# Patient Record
Sex: MALE | Race: BLACK OR AFRICAN AMERICAN | NOT HISPANIC OR LATINO | Employment: OTHER | URBAN - METROPOLITAN AREA
[De-identification: names, ages, dates, MRNs, and addresses within clinical notes are randomized per-mention and may not be internally consistent; named-entity substitution may affect disease eponyms.]

---

## 2023-06-02 ENCOUNTER — HOSPITAL ENCOUNTER (OUTPATIENT)
Dept: DATA CONVERSION | Facility: HOSPITAL | Age: 58
End: 2023-06-02
Attending: INTERNAL MEDICINE | Admitting: INTERNAL MEDICINE
Payer: MEDICARE

## 2023-06-02 DIAGNOSIS — N18.9 CHRONIC KIDNEY DISEASE, UNSPECIFIED: ICD-10-CM

## 2023-06-02 DIAGNOSIS — G47.30 SLEEP APNEA, UNSPECIFIED: ICD-10-CM

## 2023-06-02 DIAGNOSIS — I48.91 UNSPECIFIED ATRIAL FIBRILLATION (MULTI): ICD-10-CM

## 2023-06-02 DIAGNOSIS — I50.23 ACUTE ON CHRONIC SYSTOLIC (CONGESTIVE) HEART FAILURE (MULTI): ICD-10-CM

## 2023-06-02 DIAGNOSIS — I13.0 HYPERTENSIVE HEART AND CHRONIC KIDNEY DISEASE WITH HEART FAILURE AND STAGE 1 THROUGH STAGE 4 CHRONIC KIDNEY DISEASE, OR UNSPECIFIED CHRONIC KIDNEY DISEASE (MULTI): ICD-10-CM

## 2023-06-06 LAB
POC ACTIVATED CLOTTING TIME HIGH RANGE: 217 SECONDS (ref 96–152)
POC ACTIVATED CLOTTING TIME HIGH RANGE: 314 SECONDS (ref 96–152)
POC ACTIVATED CLOTTING TIME HIGH RANGE: 349 SECONDS (ref 96–152)
POC ACTIVATED CLOTTING TIME HIGH RANGE: 362 SECONDS (ref 96–152)
POC ACTIVATED CLOTTING TIME HIGH RANGE: 372 SECONDS (ref 96–152)

## 2023-08-25 ENCOUNTER — NURSE TRIAGE (OUTPATIENT)
Dept: OTHER | Facility: CLINIC | Age: 58
End: 2023-08-25

## 2023-08-25 NOTE — TELEPHONE ENCOUNTER
Location of patient: [place]    Subjective: Caller states \"When I awaken in the am my eyes are bloodshot. I don't have any energy and my knees hurt. It hurts when I walk. When I stand up I get dizzy. \"     Current Symptoms: no vision     Onset:  weakness and fatigue for about a week     Pain Severity:  severe pain in knees when walking    What has been tried: nothing so far    Recommended disposition: Go to ED Now    Care advice provided, patient verbalizes understanding; denies any other questions or concerns; instructed to call back for any new or worsening symptoms. Pt reports dizziness on ambulation and severe pain. Triage reveals need to be evaluated right away. This triage is a result of a call to [address] [place] [address]. Please do not respond to the triage nurse through this encounter. Any subsequent communication should be directly with the patient.     Reason for Disposition   Patient sounds very sick or weak to the triager    Protocols used: Weakness (Generalized) and Fatigue-ADULT-

## 2023-09-15 ENCOUNTER — HOSPITAL ENCOUNTER (OUTPATIENT)
Facility: HOSPITAL | Age: 58
Setting detail: OUTPATIENT SURGERY
End: 2023-09-15
Attending: SURGERY | Admitting: SURGERY
Payer: COMMERCIAL

## 2023-09-30 NOTE — H&P
History of Present Illness:   History Present Illness:  Reason for surgery: afib ablation   HPI:    He is a 58 year-old with    1. CKD and HTN  2. ANTONIO - on CPAP  3. Polysubstance abuse - cocaine, ETOH, tobacco but he has stopped taking any drugs at the end of 2020.   4. HFrEF   5. Cardiac arrest - put under anesthesia for hernia surgery and arrested. Surgery not done  6. Persistent AF - on EKG (AUG 2020) and (FEB 2022)     DCCV at [facility] (JUNE 2020). The cardioversion was around the time he had hiatal hernia surgery and he needed to be cardioverted because of hemodynamic instability. He subsequently reverted back into atrial fibrillation. He had stopped working because of  limitations he had secondary to the atrial fibrillation.     Feb 2022: We discussed the atrial fibrillation I feel because of his age, depressed EF, symptoms clearly rhythm control is preferable. We discussed Amiodarone (his GFR is in the 40's ) vs PVI. I feel in patients with heart failure the proarrhythmic effects  of antiarrhythmics outweigh the risk of the antiarrhythmics. I did explain the PVI to him with all the risks and benefits and we are proceeding with it on April 14, 2022.    TESTING:      -CARDIAC MRI (APRL 2020) at [facility] showed findings suggestive to NICM dialated, LV mildly dilated with mod global systolic dysfunction (LVEF 37%). Mild mid myocardial enhancement involving mid-distal inferior and inferolateral wals as well as basal = mid  RV insertion points. Unable to distinguish between active inflammation and scar. Normal RV size and systolic function. Biatrial enlargement. No significant valve dysfunction.      -NM PET/CT CARDIAC VIABILITY (MARCH 2020) conclusion abnormal study. No evidence of ischemia. Evidence of small (<10%) scar in the territory of the distal LAD. LV severely dilated and systolic function is moderately decreased. Normal RV size and systolic  function.    -ECHO: TTE (Feb 2023) LVEF 40%. LV global hypokinesis  with minor regional variations. LA mod dilated. Pt in AF during exam. LVIDd 6 cm.         Allergies:        Allergies:  ·  No Known Allergies :     Home Medication Review:   Home Medications Reviewed: yes     Impression/Procedure:   ·  Impression and Planned Procedure: afib abd CTI ablation       ERAS (Enhanced Recovery After Surgery):  ·  ERAS Patient: no       Physical Exam by System:    Respiratory/Thorax: Patent airways, CTAB, normal  breath sounds with good chest expansion, thorax symmetric   Cardiovascular: Regular, rate and rhythm, no murmurs,  2+ equal pulses of the extremities, normal S 1and S 2     Airway/Sedation Assessment:  ·  Assmentment by Anesthesia See anesthesia airway/sedation assessment.     Consent:   COVID-19 Consent:  ·  COVID-19 Risk Consent Surgeon has reviewed key risks related to the risk of britany COVID-19 and if they contract COVID-19 what the risks are.     Coronavirus Attestation of Need for Surgery:  ·  COVID-19 Surgery / Procedure Attestation: Select all criteria that apply Risk of metastasis or progression of staging if delayed     Attestation:   Note Completion:  I am a:  Resident/Fellow   Attending Attestation I saw and evaluated the patient.  I personally obtained the key and critical portions of the history and physical exam or was physically present for key and  critical portions performed by the resident/fellow. I reviewed the resident/fellow?s documentation and discussed the patient with the resident/fellow.  I agree with the resident/fellow?s medical decision making as documented in the note.     I personally evaluated the patient on 02-Jun-2023         Electronic Signatures:  Geovanny Zuniga)  (Signed 02-Jun-2023 14:16)   Authored: Note Completion   Co-Signer: History of Present Illness, Allergies, Home Medication Review, Impression/Procedure, ERAS, Physical Exam, Consent  Roberto Carlos Zeng (Resident))  (Signed 02-Jun-2023 07:21)   Authored: History of Present  Illness, Allergies, Home  Medication Review, Impression/Procedure, ERAS, Physical Exam, Consent      Last Updated: 02-Jun-2023 14:16 by Geovanny Zuniga)

## 2023-10-02 ENCOUNTER — TELEPHONE (OUTPATIENT)
Dept: SURGERY | Facility: CLINIC | Age: 58
End: 2023-10-02

## 2023-10-02 ENCOUNTER — OFFICE VISIT (OUTPATIENT)
Dept: SURGERY | Facility: CLINIC | Age: 58
End: 2023-10-02
Payer: MEDICARE

## 2023-10-02 NOTE — PROGRESS NOTES
Per dr jaqueline schmidt appt to make new on with Migel lynn called patient gave him all the info on new

## 2023-10-03 PROBLEM — K62.89 RECTAL PAIN: Status: ACTIVE | Noted: 2023-10-03

## 2023-10-03 PROBLEM — I50.9 HEART FAILURE (MULTI): Status: ACTIVE | Noted: 2023-10-03

## 2023-10-03 PROBLEM — K60.2 RECTAL FISSURE: Status: ACTIVE | Noted: 2023-10-03

## 2023-10-03 PROBLEM — K62.5 HEMORRHAGE OF RECTUM AND ANUS: Status: ACTIVE | Noted: 2022-03-07

## 2023-10-03 PROBLEM — R19.5 POSITIVE COLORECTAL CANCER SCREENING USING COLOGUARD TEST: Status: ACTIVE | Noted: 2023-10-03

## 2023-10-03 PROBLEM — M21.6X2 PRONATION OF BOTH FEET: Status: ACTIVE | Noted: 2023-10-03

## 2023-10-03 PROBLEM — R63.5 WEIGHT GAIN: Status: ACTIVE | Noted: 2023-10-03

## 2023-10-03 PROBLEM — I25.9: Status: ACTIVE | Noted: 2023-10-03

## 2023-10-03 PROBLEM — I12.9 ARTERIOLAR NEPHRITIS: Status: ACTIVE | Noted: 2021-06-09

## 2023-10-03 PROBLEM — L73.2 HIDRADENITIS SUPPURATIVA: Status: ACTIVE | Noted: 2023-05-23

## 2023-10-03 PROBLEM — K62.5 RECTAL BLEEDING: Status: ACTIVE | Noted: 2023-10-03

## 2023-10-03 PROBLEM — R94.128 ABNORMAL TYMPANOGRAM: Status: ACTIVE | Noted: 2023-10-03

## 2023-10-03 PROBLEM — H52.03 HYPEROPIA OF BOTH EYES: Status: ACTIVE | Noted: 2023-10-03

## 2023-10-03 PROBLEM — R00.2 PALPITATIONS: Status: ACTIVE | Noted: 2023-10-03

## 2023-10-03 PROBLEM — K42.9 REDUCIBLE UMBILICAL HERNIA: Status: ACTIVE | Noted: 2023-10-03

## 2023-10-03 PROBLEM — N17.9 ACUTE RENAL FAILURE SUPERIMPOSED ON STAGE 3 CHRONIC KIDNEY DISEASE (MULTI): Status: ACTIVE | Noted: 2023-10-03

## 2023-10-03 PROBLEM — E78.5 ACQUIRED HYPERLIPOPROTEINEMIA: Status: ACTIVE | Noted: 2022-07-07

## 2023-10-03 PROBLEM — I13.0 HEART FAILURE AND KIDNEY DISEASE DUE TO HIGH BLOOD PRESSURE (MULTI): Status: ACTIVE | Noted: 2020-08-11

## 2023-10-03 PROBLEM — I21.4 NSTEMI, INITIAL EPISODE OF CARE (MULTI): Status: ACTIVE | Noted: 2023-10-03

## 2023-10-03 PROBLEM — R19.8 ABNORMAL DEFECATION: Status: ACTIVE | Noted: 2023-10-03

## 2023-10-03 PROBLEM — I16.1 HYPERTENSIVE EMERGENCY: Status: ACTIVE | Noted: 2023-10-03

## 2023-10-03 PROBLEM — F19.10 POLYSUBSTANCE ABUSE (MULTI): Status: ACTIVE | Noted: 2023-10-03

## 2023-10-03 PROBLEM — L60.8 NAIL DEFORMITY: Status: ACTIVE | Noted: 2023-10-03

## 2023-10-03 PROBLEM — L02.91 ABSCESS: Status: ACTIVE | Noted: 2023-10-03

## 2023-10-03 PROBLEM — I11.0 BENIGN HYPERTENSIVE HEART DISEASE WITH HEART FAILURE (MULTI): Status: ACTIVE | Noted: 2022-03-07

## 2023-10-03 PROBLEM — I50.20 SYSTOLIC CONGESTIVE HEART FAILURE (MULTI): Status: ACTIVE | Noted: 2022-03-07

## 2023-10-03 PROBLEM — I10 ESSENTIAL HYPERTENSION: Status: ACTIVE | Noted: 2023-10-03

## 2023-10-03 PROBLEM — I50.23: Status: ACTIVE | Noted: 2023-10-03

## 2023-10-03 PROBLEM — L02.419 CELLULITIS AND ABSCESS OF LOWER EXTREMITY: Status: ACTIVE | Noted: 2021-06-09

## 2023-10-03 PROBLEM — E66.01 MORBID OBESITY (MULTI): Status: ACTIVE | Noted: 2021-06-09

## 2023-10-03 PROBLEM — H52.203 ASTIGMATISM OF BOTH EYES: Status: ACTIVE | Noted: 2023-10-03

## 2023-10-03 PROBLEM — M21.6X1 PRONATION OF BOTH FEET: Status: ACTIVE | Noted: 2023-10-03

## 2023-10-03 PROBLEM — L03.119 CELLULITIS AND ABSCESS OF LOWER EXTREMITY: Status: ACTIVE | Noted: 2021-06-09

## 2023-10-03 PROBLEM — N52.9 ERECTILE DISORDER: Status: ACTIVE | Noted: 2023-10-03

## 2023-10-03 PROBLEM — T83.9XXS: Status: ACTIVE | Noted: 2023-10-03

## 2023-10-03 PROBLEM — N18.30 CKD (CHRONIC KIDNEY DISEASE) STAGE 3, GFR 30-59 ML/MIN (MULTI): Status: ACTIVE | Noted: 2020-08-11

## 2023-10-03 PROBLEM — K63.5 HYPERPLASTIC COLON POLYP: Status: ACTIVE | Noted: 2023-10-03

## 2023-10-03 PROBLEM — N18.30 ACUTE RENAL FAILURE SUPERIMPOSED ON STAGE 3 CHRONIC KIDNEY DISEASE (MULTI): Status: ACTIVE | Noted: 2023-10-03

## 2023-10-03 PROBLEM — Z98.84 BARIATRIC SURGERY STATUS: Status: ACTIVE | Noted: 2023-10-03

## 2023-10-03 PROBLEM — R06.00 DYSPNEA: Status: ACTIVE | Noted: 2023-10-03

## 2023-10-03 PROBLEM — G47.33 OSA ON CPAP: Status: ACTIVE | Noted: 2023-10-03

## 2023-10-03 PROBLEM — I48.91 ATRIAL FIBRILLATION (MULTI): Status: ACTIVE | Noted: 2021-06-09

## 2023-10-03 PROBLEM — N18.9 CKD (CHRONIC KIDNEY DISEASE): Status: ACTIVE | Noted: 2023-10-03

## 2023-10-03 PROBLEM — G47.30 SLEEP APNEA IN ADULT: Status: ACTIVE | Noted: 2023-10-03

## 2023-10-03 RX ORDER — NYSTATIN 100000 [USP'U]/G
POWDER TOPICAL
COMMUNITY
Start: 2023-09-23

## 2023-10-03 RX ORDER — TRAZODONE HYDROCHLORIDE 100 MG/1
TABLET ORAL
COMMUNITY
Start: 2023-09-23

## 2023-10-03 RX ORDER — OXYCODONE AND ACETAMINOPHEN 5; 325 MG/1; MG/1
1 TABLET ORAL EVERY 4 HOURS PRN
COMMUNITY
Start: 2023-08-31

## 2023-10-03 RX ORDER — TORSEMIDE 20 MG/1
20 TABLET ORAL 2 TIMES DAILY
COMMUNITY

## 2023-10-03 RX ORDER — FLUOXETINE HCL 20 MG
20 CAPSULE ORAL DAILY
COMMUNITY
Start: 2022-03-03

## 2023-10-03 RX ORDER — CARVEDILOL 12.5 MG/1
3 TABLET ORAL 2 TIMES DAILY
COMMUNITY
Start: 2023-08-23

## 2023-10-03 RX ORDER — APIXABAN 5 MG/1
5 TABLET, FILM COATED ORAL 2 TIMES DAILY
COMMUNITY

## 2023-10-03 RX ORDER — SENNOSIDES 25 MG/1
1 TABLET, FILM COATED ORAL
COMMUNITY
Start: 2022-02-21

## 2023-10-03 RX ORDER — TEMAZEPAM 15 MG/1
CAPSULE ORAL
COMMUNITY
Start: 2022-12-14

## 2023-10-03 RX ORDER — FLUTICASONE PROPIONATE 50 MCG
2 SPRAY, SUSPENSION (ML) NASAL DAILY
COMMUNITY
Start: 2023-09-27

## 2023-10-03 RX ORDER — FLUOCINOLONE ACETONIDE 0.11 MG/ML
OIL AURICULAR (OTIC)
COMMUNITY
Start: 2023-09-27

## 2023-10-03 RX ORDER — SILDENAFIL 50 MG/1
50 TABLET, FILM COATED ORAL AS NEEDED
COMMUNITY
Start: 2020-08-26

## 2023-10-03 RX ORDER — LISINOPRIL 20 MG/1
20 TABLET ORAL 2 TIMES DAILY
COMMUNITY

## 2023-10-03 RX ORDER — ZOLPIDEM TARTRATE 5 MG/1
5 TABLET ORAL NIGHTLY PRN
COMMUNITY
Start: 2023-05-26

## 2023-10-03 RX ORDER — ATORVASTATIN CALCIUM 10 MG/1
TABLET, FILM COATED ORAL
COMMUNITY
Start: 2023-07-22

## 2023-10-03 RX ORDER — LIDOCAINE 50 MG/G
PATCH TOPICAL
COMMUNITY
Start: 2023-07-22

## 2023-10-03 RX ORDER — SPIRONOLACTONE 25 MG/1
12.5 TABLET ORAL DAILY
COMMUNITY

## 2023-10-03 RX ORDER — IBUPROFEN 600 MG/1
TABLET ORAL
COMMUNITY
Start: 2023-09-23

## 2023-10-05 ENCOUNTER — APPOINTMENT (OUTPATIENT)
Dept: SURGERY | Facility: CLINIC | Age: 58
End: 2023-10-05
Payer: MEDICARE

## 2023-10-12 ENCOUNTER — APPOINTMENT (OUTPATIENT)
Dept: SURGERY | Facility: CLINIC | Age: 58
End: 2023-10-12
Payer: MEDICARE

## 2023-10-24 ENCOUNTER — APPOINTMENT (OUTPATIENT)
Dept: SURGERY | Facility: CLINIC | Age: 58
End: 2023-10-24
Payer: MEDICARE

## 2023-10-27 ENCOUNTER — OFFICE VISIT (OUTPATIENT)
Dept: SURGERY | Facility: CLINIC | Age: 58
End: 2023-10-27
Payer: MEDICARE

## 2023-10-27 VITALS
BODY MASS INDEX: 39.17 KG/M2 | HEIGHT: 75 IN | TEMPERATURE: 96.9 F | WEIGHT: 315 LBS | SYSTOLIC BLOOD PRESSURE: 118 MMHG | HEART RATE: 65 BPM | DIASTOLIC BLOOD PRESSURE: 64 MMHG

## 2023-10-27 DIAGNOSIS — K42.9 UMBILICAL HERNIA WITHOUT OBSTRUCTION OR GANGRENE: Primary | ICD-10-CM

## 2023-10-27 PROCEDURE — 99214 OFFICE O/P EST MOD 30 MIN: CPT | Performed by: STUDENT IN AN ORGANIZED HEALTH CARE EDUCATION/TRAINING PROGRAM

## 2023-10-27 PROCEDURE — 3078F DIAST BP <80 MM HG: CPT | Performed by: STUDENT IN AN ORGANIZED HEALTH CARE EDUCATION/TRAINING PROGRAM

## 2023-10-27 PROCEDURE — 3074F SYST BP LT 130 MM HG: CPT | Performed by: STUDENT IN AN ORGANIZED HEALTH CARE EDUCATION/TRAINING PROGRAM

## 2023-10-27 ASSESSMENT — PAIN SCALES - GENERAL: PAINLEVEL: 10-WORST PAIN EVER

## 2023-10-27 NOTE — PROGRESS NOTES
Reason for visit  Consult for ventral umbilical hernia repair     History Of Present Illness  Pt is a 58-year-old male presenting for a consultation for a ventral umbilical hernia repair. Pt endorses having the hernia for the past 20 years. Denies hx of previous hernias. Denies associated pain, tenderness, skin changes, fever, or abdominal pain. Pt endorses that hernia is reducible.  Patient has a fairly significant past cardiac history including atrial fibrillation status post ablation and is currently in sinus rhythm.  He also had an unfortunate cardiac arrest at the time of anesthesia induction secondary to arrhythmias.  He follows closely with a cardiologist here at  and is currently on anticoagulation.     Past Medical History  CKD, HTN, HFrEF, polysubstance use disorder, persistent atrial fibrillation     Medications  apixaban, atorvastatin, carvedilol, nystatin, fluticasone, trazodone, zolpidem    Surgical History  06/2020 -attempted hiatal hernia repair complicated by cardiac arrest on anesthesia induction.    Social History  Current nicotine use. > 10 pack year smoking history.      Allergies  No Known Allergies.     Review of Systems  - CONSTITUTIONAL: Denies fever, chills, recent weight loss.  - RESPIRATORY: Denies SOB and cough.  - CV: Denies CP.  - GI: Denies abdominal pain, nausea, change in bowel habits.    - : Denies oliguria, dysuria, polyuria.  - Neuro: Denies lower limb weakness, parasthesia. Endorses bilateral     dorsal/plantar metatarsal numbness.     Physical Exam  - GENERAL: No acute distress. Well nourished.   - LUNGS: Breathing comfortably on room air. No accessory muscle      use, no distress.  - CARDIOVASCULAR: Regular rate and rhythm. S1 and S2 detected.  -ABDOMEN: Soft non-distended.  Reducible 4-5 cm diameter       umbilical mass noted. Mass is nontender, w/o erythema, edema,     overlying skin changes.  - EXTREMITIES: No edema. Non-tender. +2 DP pulses bilaterally.  - SKIN: No  rashes or lesions. Warm. 5 mm cyst near left cephalad      aspect of the gluteal cleft. Produces purulent fluid upon      palpation.       Assessment and Plan  Patient is a 58-year-old male presenting for a ventral umbilical hernia repair. We discussed the etiology, pathophysiology, various treatment options, and potential risks associated with both watchful waiting vs surgical management. Patient endorses desire for surgical repair of hernia.  He [place] [facility] [place] [facility] due to his fairly significant cardiac history.  Will plan for elective robot assisted ventral hernia repair in the near future.  [facility] and therefore saw his cardiologist for a preoperative evaluation which is completed and reviewed in his chart.  He is cleared to stop his anticoagulation 2 days prior to the procedure and will restart pending the operative findings and outcome.  The risks including bleeding, infection, nonresolution of symptoms, hernia recurrence, anesthesia complications approximate the patient voiced understanding.  Informed consent was signed for the above procedure in the office he will be scheduled in the near future for the above procedure.

## 2023-11-15 ENCOUNTER — DOCUMENTATION (OUTPATIENT)
Dept: SURGERY | Facility: HOSPITAL | Age: 58
End: 2023-11-15
Payer: MEDICARE

## 2023-11-15 NOTE — PROGRESS NOTES
Received a CRM for patient to get scheduled and restarted with the program. The patient has been away from the program for over 1 year, he was sent to the website so that new verification can be completed. He was advised after that has been completed, he will be contacted and scheduled if applicable. He understood.

## 2023-11-20 ENCOUNTER — HOSPITAL ENCOUNTER (OUTPATIENT)
Facility: HOSPITAL | Age: 58
Setting detail: OUTPATIENT SURGERY
End: 2023-11-20
Attending: STUDENT IN AN ORGANIZED HEALTH CARE EDUCATION/TRAINING PROGRAM | Admitting: STUDENT IN AN ORGANIZED HEALTH CARE EDUCATION/TRAINING PROGRAM
Payer: MEDICARE

## 2023-11-20 PROBLEM — K42.9 UMBILICAL HERNIA WITHOUT OBSTRUCTION OR GANGRENE: Status: ACTIVE | Noted: 2023-10-27

## 2024-02-12 DIAGNOSIS — K42.9 UMBILICAL HERNIA WITHOUT OBSTRUCTION AND WITHOUT GANGRENE: ICD-10-CM

## 2024-02-14 ENCOUNTER — HOSPITAL ENCOUNTER (OUTPATIENT)
Facility: HOSPITAL | Age: 59
Setting detail: OUTPATIENT SURGERY
End: 2024-02-14
Attending: STUDENT IN AN ORGANIZED HEALTH CARE EDUCATION/TRAINING PROGRAM | Admitting: STUDENT IN AN ORGANIZED HEALTH CARE EDUCATION/TRAINING PROGRAM
Payer: MEDICARE

## 2024-02-14 PROBLEM — K42.9 UMBILICAL HERNIA WITHOUT OBSTRUCTION AND WITHOUT GANGRENE: Status: ACTIVE | Noted: 2024-02-12

## 2024-04-01 ENCOUNTER — APPOINTMENT (OUTPATIENT)
Dept: PREADMISSION TESTING | Facility: HOSPITAL | Age: 59
End: 2024-04-01
Payer: MEDICARE

## 2024-04-05 NOTE — PROGRESS NOTES
Chief Complaint   Patient presents with    Error (VOID this visit)        Current Outpatient Medications   Medication Instructions    atorvastatin (Lipitor) 10 mg tablet     benzoyl peroxide 5 % lotion 1 Application    carvedilol (Coreg) 12.5 mg tablet 3 tablets, oral, 2 times daily    Eliquis 5 mg, oral, 2 times daily    fluocinolone (DermOtic) 0.01 % ear drops     fluticasone (Flonase) 50 mcg/actuation nasal spray 2 sprays, Each Nostril, Daily     mg tablet     lidocaine (Lidoderm) 5 % patch Apply as directed    lidocaine (RectiCare) 5 % cream cream 1 Application, Topical, 1 to 3 times a day as needed for pain    lisinopril 20 mg, oral, 2 times daily    nystatin (Mycostatin) 100,000 unit/gram powder     oxyCODONE-acetaminophen (Percocet) 5-325 mg tablet 1 tablet, oral, Every 4 hours PRN    PROzac 20 mg, oral, Daily    sildenafil (VIAGRA) 50 mg, oral, As needed    spironolactone (ALDACTONE) 12.5 mg, oral, Daily    temazepam (Restoril) 15 mg capsule     torsemide (DEMADEX) 20 mg, oral, 2 times daily    traZODone (Desyrel) 100 mg tablet     zolpidem (AMBIEN) 5 mg, oral, Nightly PRN        Joel Rutherford is a 59 y.o. with:     CKD and HTN  ANTONIO - on CPAP  Polysubstance abuse â€“ cocaine, ETOH, tobacco but he has stopped taking any drugs at the end of 2020.   HFrEF   Cardiac arrest - put under anesthesia for hernia surgery and arrested. Surgery not done  Persistent AF â€“ on EKG (AUG 2020) and (FEB 2022)     DCCV at [facility] (JUNE 2020). The cardioversion was around the time he had hiatal hernia surgery and he needed to be cardioverted because of hemodynamic instability. He subsequently reverted back into atrial fibrillation. He had stopped working because of limitations he had secondary to the atrial fibrillation.      Feb 2022: We discussed the atrial fibrillation I feel because of his age, depressed EF, symptoms clearly rhythm control is preferable. Scheduled PVI in April of 2022, was rescheduled due to pt getting  incarcerated.   June 2023: (June 2) Successful AF ablation and CTI line ablation . During ablation on the ridge and around the floor of the right inferior pulmonary vein frequent episodes of asystole were seen requiring ventricular pacing. First pass was not achieved on eitherside and additional lesions needed to be applied. (June 20) He is in NSR and feels better.         TESTING:        -ECHO: TTE (Feb 2023) LVEF 40%. LV global hypokinesis with minor regional variations. LA mod dilated. Pt in AF during exam. LVIDd 6 cm.     -CARDIAC MRI (APRL 2020) at [facility] showed findings suggestive to NICM dialated, LV mildly dilated with mod global systolic dysfunction (LVEF 37%). Mild mid myocardial enhancement involving mid-distal inferior and inferolateral wals as well as basal = mid RV insertion points. Unable to distinguish between active inflammation and scar. Normal RV size and systolic function. Biatrial enlargement. No significant valve dysfunction.      -NM PET/CT CARDIAC VIABILITY (MARCH 2020) conclusion abnormal study. No evidence of ischemia. Evidence of small (<10%) scar in the territory of the distal LAD. LV severely dilated and systolic function is moderately decreased. Normal RV size and systolic function.         Objective   Physical Exam  {exam; complete:94952}        Assessment/Plan   No problem-specific Assessment & Plan notes found for this encounter.

## 2024-04-08 ENCOUNTER — OFFICE VISIT (OUTPATIENT)
Dept: CARDIOLOGY | Facility: HOSPITAL | Age: 59
End: 2024-04-08
Payer: MEDICARE

## 2024-04-08 ENCOUNTER — APPOINTMENT (OUTPATIENT)
Dept: PREADMISSION TESTING | Facility: HOSPITAL | Age: 59
End: 2024-04-08
Payer: MEDICARE

## 2024-04-29 ENCOUNTER — DOCUMENTATION (OUTPATIENT)
Dept: CARDIOLOGY | Facility: HOSPITAL | Age: 59
End: 2024-04-29
Payer: MEDICARE

## 2024-04-29 NOTE — PROGRESS NOTES
EP PRE-CHART NOTE:      HF  AF    He is here for a 7 month follow-up.     CKD and HTN  ANTONIO - on CPAP  Polysubstance abuse â€“ cocaine, ETOH, tobacco but he has stopped taking any drugs at the end of 2020.   HFrEF   Cardiac arrest - put under anesthesia for hernia surgery and arrested. Surgery not done  Persistent AF â€“ on EKG (AUG 2020) and (FEB 2022)     DCCV at [facility] (JUNE 2020). The cardioversion was around the time he had hiatal hernia surgery and he needed to be cardioverted because of hemodynamic instability. He subsequently reverted back into atrial fibrillation. He had stopped working because of limitations he had secondary to the atrial fibrillation.      Feb 2022: We discussed the atrial fibrillation I feel because of his age, depressed EF, symptoms clearly rhythm control is preferable. Scheduled PVI in April of 2022, was rescheduled due to pt getting incarcerated.   June 2023: (June 2) Successful AF ablation and CTI line ablation . During ablation on the ridge and around the floor of the right inferior pulmonary vein frequent episodes of asystole were seen requiring ventricular pacing. First pass was not achieved on eitherside and additional lesions needed to be applied. (June 20) He is in NSR and feels better.         TESTING:                   -ECHO: TTE (Feb 2023) LVEF 40%. LV global hypokinesis with minor regional variations. LA mod dilated. Pt in AF during exam. LVIDd 6 cm.      -CARDIAC MRI (APRL 2020) at [facility] showed findings suggestive to NICM dialated, LV mildly dilated with mod global systolic dysfunction (LVEF 37%). Mild mid myocardial enhancement involving mid-distal inferior and inferolateral wals as well as basal = mid RV insertion points. Unable to distinguish between active inflammation and scar. Normal RV size and systolic function. Biatrial enlargement. No significant valve dysfunction.      -NM PET/CT CARDIAC VIABILITY (MARCH 2020) conclusion abnormal study. No evidence of ischemia.  Evidence of small (<10%) scar in the territory of the distal LAD. LV severely dilated and systolic function is moderately decreased. Normal RV size and systolic function.

## 2024-07-02 PROBLEM — K57.30 DIVERTICULOSIS OF LARGE INTESTINE WITHOUT HEMORRHAGE: Status: ACTIVE | Noted: 2021-12-13

## 2024-07-02 PROBLEM — F17.200 NICOTINE USE DISORDER: Status: ACTIVE | Noted: 2019-12-26

## 2024-07-02 PROBLEM — Z86.74 HISTORY OF CARDIAC ARREST: Status: ACTIVE | Noted: 2022-09-02

## 2024-07-17 ENCOUNTER — APPOINTMENT (OUTPATIENT)
Dept: CARDIOLOGY | Facility: CLINIC | Age: 59
End: 2024-07-17
Payer: MEDICARE

## 2024-08-14 ENCOUNTER — OFFICE VISIT (OUTPATIENT)
Dept: CARDIOLOGY | Facility: CLINIC | Age: 59
End: 2024-08-14
Payer: MEDICARE

## 2024-08-14 VITALS
DIASTOLIC BLOOD PRESSURE: 81 MMHG | HEIGHT: 75 IN | HEART RATE: 112 BPM | WEIGHT: 301 LBS | BODY MASS INDEX: 37.42 KG/M2 | OXYGEN SATURATION: 95 % | SYSTOLIC BLOOD PRESSURE: 119 MMHG

## 2024-08-14 DIAGNOSIS — I48.91 ATRIAL FIBRILLATION, UNSPECIFIED TYPE (MULTI): ICD-10-CM

## 2024-08-14 DIAGNOSIS — I10 ESSENTIAL HYPERTENSION: ICD-10-CM

## 2024-08-14 DIAGNOSIS — I50.22 CHRONIC SYSTOLIC HEART FAILURE (MULTI): Primary | ICD-10-CM

## 2024-08-14 PROCEDURE — 93005 ELECTROCARDIOGRAM TRACING: CPT | Performed by: INTERNAL MEDICINE

## 2024-08-14 PROCEDURE — 93010 ELECTROCARDIOGRAM REPORT: CPT | Performed by: INTERNAL MEDICINE

## 2024-08-14 PROCEDURE — 3074F SYST BP LT 130 MM HG: CPT | Performed by: INTERNAL MEDICINE

## 2024-08-14 PROCEDURE — 99214 OFFICE O/P EST MOD 30 MIN: CPT | Performed by: INTERNAL MEDICINE

## 2024-08-14 PROCEDURE — 3008F BODY MASS INDEX DOCD: CPT | Performed by: INTERNAL MEDICINE

## 2024-08-14 PROCEDURE — 3079F DIAST BP 80-89 MM HG: CPT | Performed by: INTERNAL MEDICINE

## 2024-08-14 ASSESSMENT — PAIN SCALES - GENERAL: PAINLEVEL: 6

## 2024-08-14 ASSESSMENT — PATIENT HEALTH QUESTIONNAIRE - PHQ9
3. TROUBLE FALLING OR STAYING ASLEEP OR SLEEPING TOO MUCH: NEARLY EVERY DAY
8. MOVING OR SPEAKING SO SLOWLY THAT OTHER PEOPLE COULD HAVE NOTICED. OR THE OPPOSITE, BEING SO FIGETY OR RESTLESS THAT YOU HAVE BEEN MOVING AROUND A LOT MORE THAN USUAL: NOT AT ALL
2. FEELING DOWN, DEPRESSED OR HOPELESS: MORE THAN HALF THE DAYS
1. LITTLE INTEREST OR PLEASURE IN DOING THINGS: NEARLY EVERY DAY
5. POOR APPETITE OR OVEREATING: MORE THAN HALF THE DAYS
7. TROUBLE CONCENTRATING ON THINGS, SUCH AS READING THE NEWSPAPER OR WATCHING TELEVISION: NOT AT ALL
SUM OF ALL RESPONSES TO PHQ QUESTIONS 1-9: 12
9. THOUGHTS THAT YOU WOULD BE BETTER OFF DEAD, OR OF HURTING YOURSELF: NOT AT ALL
SUM OF ALL RESPONSES TO PHQ9 QUESTIONS 1 AND 2: 5
4. FEELING TIRED OR HAVING LITTLE ENERGY: MORE THAN HALF THE DAYS
6. FEELING BAD ABOUT YOURSELF - OR THAT YOU ARE A FAILURE OR HAVE LET YOURSELF OR YOUR FAMILY DOWN: NOT AT ALL

## 2024-08-14 ASSESSMENT — COLUMBIA-SUICIDE SEVERITY RATING SCALE - C-SSRS
6. HAVE YOU EVER DONE ANYTHING, STARTED TO DO ANYTHING, OR PREPARED TO DO ANYTHING TO END YOUR LIFE?: NO
2. HAVE YOU ACTUALLY HAD ANY THOUGHTS OF KILLING YOURSELF?: NO
1. IN THE PAST MONTH, HAVE YOU WISHED YOU WERE DEAD OR WISHED YOU COULD GO TO SLEEP AND NOT WAKE UP?: NO

## 2024-08-14 NOTE — PROGRESS NOTES
Subjective   Joel Rutherford is a 59 y.o. male who presents to the [facility] for follow up evaluation of chronic systolic heart failure and atrial fibrillation. Last saw in February 2023.     Since his last visit, stable cardiac status with no active symptoms of chest pain, PND, orthopnea, THI, palpitations, syncope, or claudication.     Stable exertional dyspnea with 1-2 flights of stair climbing. NYHA class I-IIa. Sedentary. Has not started exercise program.     Stopped Jardiance 10 mg a day shortly after taking for concern of side effects in 2022. Attributed a rectal tear that he sought ER evaluation for to this medication. Did not go to a  facility at that time per 3/2023 office note with me.     Past Medical History:  1. Chronic systolic heart failure: 9/2019 [facility]% [facility]. 2020 MRI 37% at [facility].  2. Atrial fibrillation, chronic, on DOAC; s/p 6/2/2023 AFib PVI and CTI ablation by Dr. Zuniga.  3. Hypertension  4. CKD stage 3  5. Obesity  6. History of cardiac arrest with induction of anesthesia for canceled hernia repair surgery  7. Polyarticular gout     Social History:  Active tobacco smoker (1/2 pack/day). Prior cocaine use (last in 2019)     Family History:  No premature CAD in 1st degree relatives ( 55 years of age for male relatives, 65 years of age for female relatives)     Review of Systems    A 14 point review of systems was asked. All questions were negative except for pertinent positives listed in the HPI.     Current Outpatient Medications on File Prior to Visit   Medication Sig Dispense Refill    benzoyl peroxide 5 % lotion 1 Application.      carvedilol (Coreg) 12.5 mg tablet Take 3 tablets (37.5 mg) by mouth 2 times a day.      Eliquis 5 mg tablet Take 1 tablet (5 mg) by mouth 2 times a day.      famotidine (Pepcid) 40 mg tablet Take 1 tablet (40 mg) by mouth 2 times a day.      fluocinolone (DermOtic) 0.01 % ear drops       fluticasone (Flonase) 50  "mcg/actuation nasal spray Administer 2 sprays into each nostril once daily.       mg tablet       nystatin (Mycostatin) 100,000 unit/gram powder       oxyCODONE-acetaminophen (Percocet) 5-325 mg tablet Take 1 tablet by mouth every 4 hours if needed.      Ozempic 0.25 mg or 0.5 mg (2 mg/3 mL) pen injector       PROzac 20 mg capsule Take 1 capsule (20 mg) by mouth once daily.      sildenafil (Viagra) 50 mg tablet Take 1 tablet (50 mg) by mouth if needed (1 hour before needed).      spironolactone (Aldactone) 25 mg tablet Take 0.5 tablets (12.5 mg) by mouth once daily.      torsemide (Demadex) 20 mg tablet Take 1 tablet (20 mg) by mouth 2 times a day.      traZODone (Desyrel) 100 mg tablet       atorvastatin (Lipitor) 10 mg tablet       atorvastatin (Lipitor) 20 mg tablet       lidocaine (Lidoderm) 5 % patch Apply as directed      lidocaine (RectiCare) 5 % cream cream Apply 1 Application topically. 1 to 3 times a day as needed for pain      lisinopril 20 mg tablet Take 1 tablet (20 mg) by mouth 2 times a day.      temazepam (Restoril) 15 mg capsule       zolpidem (Ambien) 5 mg tablet Take 1 tablet (5 mg) by mouth as needed at bedtime (insomnia).       No current facility-administered medications on file prior to visit.         Objective   Physical Exam  BP Readings from Last 3 Encounters:   08/14/24 119/81   10/27/23 118/64   09/19/23 124/72      Wt Readings from Last 3 Encounters:   08/14/24 137 kg (301 lb)   10/27/23 144 kg (318 lb)   09/27/23 144 kg (317 lb 4 oz)      BMI: Estimated body mass index is 37.62 kg/m² as calculated from the following:    Height as of this encounter: 1.905 m (6' 3\").    Weight as of this encounter: 137 kg (301 lb).  BSA: Estimated body surface area is 2.69 meters squared as calculated from the following:    Height as of this encounter: 1.905 m (6' 3\").    Weight as of this encounter: 137 kg (301 lb).    General: no acute distress  HEENT: EOMI, no scleral icterus.  Lungs: Clear to " auscultation bilaterally without wheezing, rales, or rhonchi.  Cardiovascular: Regular rhythm and rate. Normal S1 and S2. No murmurs, rubs, or gallops are appreciated. JVP normal.  Abdomen: Soft, nontender, nondistended. Bowel sounds present.  Extremities: Warm and well perfused with equal 2+ pulses bilaterally.  No edema present.  Neurologic: Alert and oriented x3.      I have personally reviewed the following images and laboratory findings:  Last echocardiogram:  TTE (5/13/2024), [facility]: LV EF 48%, G1DD, basal inferoseptal segment akinetic and  basal inferior segment severely hypokinetic.   Right ventricular systolic function low normal. RVSP 22 mmHg. LV function has improved as compared to 6/22/2020. [Taken from 5/16/2024 [facility] discharge summary]. Full echo report not found on serve of media and CareEverywhere search box.    2/28/2023 (): LV EF 40%, mild conc LVH (LVMI 119 gm/m2), abnormal diastology in AFib (E/e' not reported), moderate-severe LAE (NEDA 49 ml/m2), normal RV size and function, mild HILDA, trivial AI, mild MAC, mild MR, trace TR, unable to estimate RVSP.    4/22/2020- [facility]- Cardiac MRI  IMPRESSION: 1. Overall, the constellation of findings are suggestive of a  nonischemic dilated cardiomyopathy. The left ventricle is mildly dilated with moderate global systolic dysfunction (LVEF 37%). Delayed-enhancement imaging reveals mild mid myocardial enhancement involving the mid to distal inferior and inferolateral walls as well as the basal to mid RV insertion points. These findings can be be seen in the setting of myocarditis (unable to distinguish between active inflammation and scar). No other obvious delayed enhancement to  suggest a pattern of ischemic damage. If clinically indicated, recommend  coronary angiography. 2. Normal RV size and systolic function on qualitative assessment. 3. Biatrial enlargement. 4. No significant valvular dysfunction.     Last cath / stress test:  NM PET/CT CARDIAC  "VIABILITY (2020) conclusion abnormal study. No evidence of ischemia. Evidence of small (<10%) scar in the territory of the distal LAD. LV severely dilated and systolic function is moderately decreased. Normal RV size and systolic function.     Most recent EC2024 ECG: Sinus tachycardia, 112 bpm, LVH criteria. Personally reviewed in office.    Lab Results   Component Value Date    CHOL 171 10/27/2022    CHOL 135 2020     Lab Results   Component Value Date    HDL 42.4 10/27/2022    HDL 36.3 (A) 2020     No results found for: \"LDLCALC\"  Lab Results   Component Value Date    TRIG 176 (H) 10/27/2022    TRIG 139 2020     No components found for: \"CHOLHDL\"      Assessment/Plan   1. Chronic systolic heart failure:  Euvolemic on exam today. Continue neurohormonal remodeling medications carvedilol 37.5 mg twice a day, lisinopril 20 mg twice a day, and spironolactone 12.5 mg a day. Did not tolerate use of Jardiance 10 mg a day in 2022 with ER visit with low BP / rectal complication per patient. He is adamant that he will not take medication class again. Taking torsemide 20 mg twice a day with normal volume status.     Repeat May 2024 echocardiogram at [facility] with LV EF 45-50% in sinus rhythm.      2. Chronic atrial fibrillation:  Continue Eliquis 5 mg twice a day for CVA prophylaxis. CHADS2-Vasc score 2 (1+ HTN, 1+ CHF). Continue rate control with carvedilol.      In sinus rhythm today. Follows with Dr. Zuniga in EP. Successful 2023 RFA to help restore sinus rhythm.    Follow up with Dr. Zelaya in 6 months        SIGNATURE: Diego Zelaya M.D.  [facility]  Senior Attending, Division of Cardiovascular Medicine  Co-Director, [facility]   of Medicine  [facility] [employer] [facility]  [address]  [place] [address]  Phone: [phone]  Fax: [phone]  Appointment: [phone]  PATIENT NAME: Joel " Ray   DATE/TIME: August 14, 2024 4:58 PM MRN: [id number]

## 2024-08-14 NOTE — PATIENT INSTRUCTIONS
You were seen in the [facility] for your heart failure and atrial fibrillation.     Your ECG today shows normal heart rhythm. Your fluid levels are balanced on exam of your heart and blood vessels. Your heart failure is a stable, chronic condition.     For your your heart failure you are takin. Carvedilol 37.5 mg twice a day  2. Lisinopril 20 mg twice a day  3. Spironolactone 12.5 mg a day (1/2 tablet)  4. Torsemide 20 mg 2 tablets a day     Dr. Villalpando started you on Jardiance 10 mg a day.Jardiance is a medication that is a SGLT2 inhibitor and can help reduce heart attack risk by up to 20% and heart failure hospitalization by 50%. This medication works by forcing the kidneys to lose  gram of blood sugar a day. You had a reported complication taking this medication in  with a visit to the ER. Will hold off on using a substitute at this time.     For your atrial fibrillation you are takin. Carvedilol 37.5 mg twice a day  2. Eliquis 5 mg twice a day to prevent a stroke      Follow up with Dr. Zelaya in 6 months.

## 2024-08-15 LAB
ATRIAL RATE: 112 BPM
P AXIS: 65 DEGREES
P OFFSET: 204 MS
P ONSET: 136 MS
PR INTERVAL: 180 MS
Q ONSET: 226 MS
QRS COUNT: 18 BEATS
QRS DURATION: 66 MS
QT INTERVAL: 326 MS
QTC CALCULATION(BAZETT): 444 MS
QTC FREDERICIA: 401 MS
R AXIS: -31 DEGREES
T AXIS: 66 DEGREES
T OFFSET: 389 MS
VENTRICULAR RATE: 112 BPM

## 2024-10-25 PROBLEM — D68.69 OTHER THROMBOPHILIA: Status: ACTIVE | Noted: 2024-10-25

## 2024-10-25 PROBLEM — E66.812 OBESITY, CLASS II, BMI 35-39.9: Status: ACTIVE | Noted: 2024-05-16

## 2024-10-25 PROBLEM — G63 POLYNEUROPATHY IN DISEASES CLASSIFIED ELSEWHERE (MULTI): Status: ACTIVE | Noted: 2024-10-25

## 2024-10-25 PROBLEM — I70.0 ATHEROSCLEROSIS OF AORTA (CMS-HCC): Status: ACTIVE | Noted: 2024-10-25

## 2024-10-25 PROBLEM — J42 UNSPECIFIED CHRONIC BRONCHITIS (MULTI): Status: ACTIVE | Noted: 2024-10-25

## 2024-10-25 PROBLEM — M10.9 GOUT: Status: ACTIVE | Noted: 2024-05-13

## 2024-10-25 PROBLEM — E11.9 TYPE 2 DIABETES MELLITUS WITHOUT COMPLICATIONS (MULTI): Status: ACTIVE | Noted: 2024-10-25

## 2024-10-25 PROBLEM — E26.1 SECONDARY HYPERALDOSTERONISM (MULTI): Status: ACTIVE | Noted: 2024-10-25

## 2024-10-25 PROBLEM — I42.9 CARDIOMYOPATHY: Status: ACTIVE | Noted: 2024-10-25

## 2024-10-25 PROBLEM — M10.09: Status: ACTIVE | Noted: 2024-05-13

## 2024-10-25 RX ORDER — PEN NEEDLE, DIABETIC 31 GX5/16"
NEEDLE, DISPOSABLE MISCELLANEOUS
COMMUNITY
Start: 2024-02-06

## 2024-10-25 RX ORDER — PREDNISONE 20 MG/1
TABLET ORAL
COMMUNITY
Start: 2024-08-15

## 2024-10-25 RX ORDER — OXYCODONE HYDROCHLORIDE 5 MG/1
1 TABLET ORAL EVERY 6 HOURS PRN
COMMUNITY
Start: 2024-06-28

## 2024-10-25 RX ORDER — CAPSAICIN 0.75 MG/G
1 CREAM TOPICAL 3 TIMES DAILY
COMMUNITY
Start: 2024-04-29

## 2024-10-25 RX ORDER — LEVOFLOXACIN 750 MG/1
TABLET ORAL
COMMUNITY
Start: 2024-08-13

## 2024-10-25 RX ORDER — ALLOPURINOL 100 MG/1
TABLET ORAL
COMMUNITY
Start: 2024-07-02

## 2024-10-25 RX ORDER — GABAPENTIN 100 MG/1
CAPSULE ORAL
COMMUNITY
Start: 2024-08-30

## 2024-10-25 RX ORDER — COLCHICINE 0.6 MG/1
TABLET ORAL
COMMUNITY

## 2024-10-25 RX ORDER — AMOXICILLIN AND CLAVULANATE POTASSIUM 875; 125 MG/1; MG/1
1 TABLET, FILM COATED ORAL 2 TIMES DAILY
COMMUNITY
Start: 2024-09-06

## 2024-10-25 RX ORDER — METHOCARBAMOL 750 MG/1
TABLET, FILM COATED ORAL
COMMUNITY
Start: 2024-04-29

## 2024-10-25 RX ORDER — VARENICLINE TARTRATE 1 MG/1
TABLET, FILM COATED ORAL
COMMUNITY
Start: 2024-09-10

## 2024-10-25 RX ORDER — AMLODIPINE BESYLATE 5 MG/1
1 TABLET ORAL
COMMUNITY
Start: 2024-05-03

## 2024-10-25 RX ORDER — IBUPROFEN 200 MG
1 TABLET ORAL
COMMUNITY
Start: 2024-05-17

## 2024-10-25 RX ORDER — TRAMADOL HYDROCHLORIDE 50 MG/1
TABLET ORAL
COMMUNITY
Start: 2024-09-06

## 2024-10-29 ENCOUNTER — APPOINTMENT (OUTPATIENT)
Dept: SURGERY | Facility: CLINIC | Age: 59
End: 2024-10-29
Payer: MEDICARE

## 2024-10-29 ENCOUNTER — PREP FOR PROCEDURE (OUTPATIENT)
Dept: SURGERY | Facility: CLINIC | Age: 59
End: 2024-10-29

## 2024-10-29 VITALS
OXYGEN SATURATION: 98 % | DIASTOLIC BLOOD PRESSURE: 69 MMHG | TEMPERATURE: 97.8 F | RESPIRATION RATE: 16 BRPM | SYSTOLIC BLOOD PRESSURE: 117 MMHG | HEIGHT: 75 IN | HEART RATE: 79 BPM | BODY MASS INDEX: 38.17 KG/M2 | WEIGHT: 307 LBS

## 2024-10-29 DIAGNOSIS — L05.91 PILONIDAL CYST: Primary | ICD-10-CM

## 2024-10-29 DIAGNOSIS — L05.91 PILONIDAL CYST: ICD-10-CM

## 2024-10-29 DIAGNOSIS — K42.9 UMBILICAL HERNIA WITHOUT OBSTRUCTION AND WITHOUT GANGRENE: ICD-10-CM

## 2024-10-29 DIAGNOSIS — K57.92 DIVERTICULITIS: ICD-10-CM

## 2024-10-29 PROCEDURE — 3078F DIAST BP <80 MM HG: CPT | Performed by: SURGERY

## 2024-10-29 PROCEDURE — 3008F BODY MASS INDEX DOCD: CPT | Performed by: SURGERY

## 2024-10-29 PROCEDURE — 4010F ACE/ARB THERAPY RXD/TAKEN: CPT | Performed by: SURGERY

## 2024-10-29 PROCEDURE — 3074F SYST BP LT 130 MM HG: CPT | Performed by: SURGERY

## 2024-10-29 PROCEDURE — 99203 OFFICE O/P NEW LOW 30 MIN: CPT | Performed by: SURGERY

## 2024-10-29 RX ORDER — CIPROFLOXACIN 750 MG/1
1 TABLET, FILM COATED ORAL
COMMUNITY
Start: 2024-10-20

## 2024-10-29 RX ORDER — SCOLOPAMINE TRANSDERMAL SYSTEM 1 MG/1
1 PATCH, EXTENDED RELEASE TRANSDERMAL
OUTPATIENT
Start: 2024-10-29 | End: 2024-11-01

## 2024-10-29 RX ORDER — POLYETHYLENE GLYCOL 3350 17 G/17G
255 POWDER, FOR SOLUTION ORAL SEE ADMIN INSTRUCTIONS
Qty: 15 PACKET | Refills: 0 | Status: SHIPPED | OUTPATIENT
Start: 2024-10-29 | End: 2024-10-31

## 2024-10-29 RX ORDER — HEPARIN SODIUM 5000 [USP'U]/ML
5000 INJECTION, SOLUTION INTRAVENOUS; SUBCUTANEOUS ONCE
OUTPATIENT
Start: 2024-10-29 | End: 2024-10-29

## 2024-10-29 RX ORDER — METRONIDAZOLE 500 MG/1
TABLET ORAL
COMMUNITY
Start: 2024-10-20

## 2024-10-29 RX ORDER — GABAPENTIN 300 MG/1
600 CAPSULE ORAL ONCE
OUTPATIENT
Start: 2024-10-29 | End: 2024-10-29

## 2024-10-29 RX ORDER — ACETAMINOPHEN 325 MG/1
975 TABLET ORAL ONCE
OUTPATIENT
Start: 2024-10-29 | End: 2024-10-29

## 2024-10-29 RX ORDER — METRONIDAZOLE 500 MG/100ML
500 INJECTION, SOLUTION INTRAVENOUS ONCE
OUTPATIENT
Start: 2024-10-29 | End: 2024-10-29

## 2024-10-29 ASSESSMENT — ENCOUNTER SYMPTOMS
ARTHRALGIAS: 1
APPETITE CHANGE: 1
EYES NEGATIVE: 1
NAUSEA: 0
CARDIOVASCULAR NEGATIVE: 1
ABDOMINAL PAIN: 0
VOMITING: 0
RESPIRATORY NEGATIVE: 1
ABDOMINAL DISTENTION: 0
ENDOCRINE NEGATIVE: 1

## 2024-10-29 ASSESSMENT — PAIN SCALES - GENERAL: PAINLEVEL_OUTOF10: 5

## 2024-10-29 NOTE — H&P (VIEW-ONLY)
Subjective   Patient ID: Joel Rutherford is a 59 y.o. male who presents for pilonidal cyst.  HPI  60 YO M BMI 38 on ozempic and eliquis. PMH of CKD, HFrEF, NICM, A. Fib (on eliquis), and gout (stopped eating meat). Prior diverticulitis with large diverticula on last sigmoidoscopy in 2021.    Has had a few months of drainage from known pilonidal cyst. No prior procedures.  Also large umbilical hernia, worsening for years.    Review of Systems   Constitutional:  Positive for appetite change.   HENT: Negative.     Eyes: Negative.    Respiratory: Negative.     Cardiovascular: Negative.    Gastrointestinal:  Negative for abdominal distention, abdominal pain, nausea and vomiting.   Endocrine: Negative.    Genitourinary: Negative.    Musculoskeletal:  Positive for arthralgias.   Skin: Negative.        Objective   Physical Exam  Constitutional:       Appearance: He is obese.   HENT:      Head: Atraumatic.      Nose: Nose normal.      Mouth/Throat:      Mouth: Mucous membranes are moist.   Cardiovascular:      Rate and Rhythm: Normal rate and regular rhythm.   Pulmonary:      Effort: Pulmonary effort is normal.      Breath sounds: Normal breath sounds.   Abdominal:      General: There is no distension.      Palpations: Abdomen is soft.      Tenderness: There is no guarding or rebound.      Comments: 3+ cm umbilical hernia, reducible, deep.   Genitourinary:     Comments: Two sinuses near anus. One healed area of perforation to left of cleft. No active drainage.  Skin:     General: Skin is warm.   Neurological:      Mental Status: He is alert. Mental status is at baseline.   Psychiatric:         Mood and Affect: Mood normal.         Thought Content: Thought content normal.         Judgment: Judgment normal.         Assessment/Plan   Problem List Items Addressed This Visit             ICD-10-CM       Gastrointestinal and Abdominal    Umbilical hernia without obstruction and without gangrene K42.9    Relevant Orders    Case Request  Operating Room: Repair Umbilical Hernia Robot-Assisted (Completed)     Other Visit Diagnoses         Codes    Pilonidal cyst    -  Primary L05.91    Relevant Medications    polyethylene glycol (Glycolax, Miralax) 17 gram packet    Other Relevant Orders    Case Request Operating Room: Excision Cyst Pilonidal (Completed)    Diverticulitis     K57.92          Hold ozempic x 1 week and eliquis x 48 hours prior to each procedure.  Umbilical hernia first. Pilonidal 2+ weeks after first procedure.       Mihir Obrien MD PhD 10/29/24 11:01 AM

## 2024-10-29 NOTE — PROGRESS NOTES
Subjective   Patient ID: Joel Rutherford is a 59 y.o. male who presents for pilonidal cyst.  HPI  58 YO M BMI 38 on ozempic and eliquis. PMH of CKD, HFrEF, NICM, A. Fib (on eliquis), and gout (stopped eating meat). Prior diverticulitis with large diverticula on last sigmoidoscopy in 2021.    Has had a few months of drainage from known pilonidal cyst. No prior procedures.  Also large umbilical hernia, worsening for years.    Review of Systems   Constitutional:  Positive for appetite change.   HENT: Negative.     Eyes: Negative.    Respiratory: Negative.     Cardiovascular: Negative.    Gastrointestinal:  Negative for abdominal distention, abdominal pain, nausea and vomiting.   Endocrine: Negative.    Genitourinary: Negative.    Musculoskeletal:  Positive for arthralgias.   Skin: Negative.        Objective   Physical Exam  Constitutional:       Appearance: He is obese.   HENT:      Head: Atraumatic.      Nose: Nose normal.      Mouth/Throat:      Mouth: Mucous membranes are moist.   Cardiovascular:      Rate and Rhythm: Normal rate and regular rhythm.   Pulmonary:      Effort: Pulmonary effort is normal.      Breath sounds: Normal breath sounds.   Abdominal:      General: There is no distension.      Palpations: Abdomen is soft.      Tenderness: There is no guarding or rebound.      Comments: 3+ cm umbilical hernia, reducible, deep.   Genitourinary:     Comments: Two sinuses near anus. One healed area of perforation to left of cleft. No active drainage.  Skin:     General: Skin is warm.   Neurological:      Mental Status: He is alert. Mental status is at baseline.   Psychiatric:         Mood and Affect: Mood normal.         Thought Content: Thought content normal.         Judgment: Judgment normal.         Assessment/Plan   Problem List Items Addressed This Visit             ICD-10-CM       Gastrointestinal and Abdominal    Umbilical hernia without obstruction and without gangrene K42.9    Relevant Orders    Case Request  Operating Room: Repair Umbilical Hernia Robot-Assisted (Completed)     Other Visit Diagnoses         Codes    Pilonidal cyst    -  Primary L05.91    Relevant Medications    polyethylene glycol (Glycolax, Miralax) 17 gram packet    Other Relevant Orders    Case Request Operating Room: Excision Cyst Pilonidal (Completed)    Diverticulitis     K57.92          Hold ozempic x 1 week and eliquis x 48 hours prior to each procedure.  Umbilical hernia first. Pilonidal 2+ weeks after first procedure.       Mihir Obrien MD PhD 10/29/24 11:01 AM

## 2024-10-30 ENCOUNTER — PREP FOR PROCEDURE (OUTPATIENT)
Dept: SURGERY | Facility: CLINIC | Age: 59
End: 2024-10-30
Payer: MEDICARE

## 2024-10-30 PROBLEM — L05.91 PILONIDAL CYST: Status: ACTIVE | Noted: 2024-10-29

## 2024-10-31 RX ORDER — POLYETHYLENE GLYCOL 3350 17 G/17G
17 POWDER, FOR SOLUTION ORAL DAILY
Qty: 238 G | Refills: 0 | Status: SHIPPED | OUTPATIENT
Start: 2024-10-31

## 2024-11-05 ENCOUNTER — TELEPHONE (OUTPATIENT)
Dept: SURGERY | Facility: CLINIC | Age: 59
End: 2024-11-05
Payer: MEDICARE

## 2024-11-05 NOTE — TELEPHONE ENCOUNTER
Notified patient that we received clearance from his PCP to hold his Eliquis 48 hours  prior to his surgery, and also to hold his Ozempic seven days prior to surgery: umbilical hernia repair November 25 and excision of pilonidal cyst on December 9. Patient verbalized understanding and consent.  All questions were addressed and answered.

## 2024-11-15 ENCOUNTER — TELEPHONE (OUTPATIENT)
Dept: SURGERY | Facility: CLINIC | Age: 59
End: 2024-11-15
Payer: MEDICARE

## 2024-11-15 NOTE — TELEPHONE ENCOUNTER
Left a message for the patient that his appointment with Dr. Obrien on 12/17/2024 was cancelled, and the appointment on 1/2/2025 is still on the schedule. Documenting. Thank you.

## 2024-11-18 NOTE — TELEPHONE ENCOUNTER
Patient called asking for clarification as he got a text reminder about his appointments being changed.  I verbalized that he is just now going to be having one post op visit for his two procedures: umbilical hernia repair in November, and pilonidal cyst excision in December.  All questions were addressed and answered along with patient verbalizing understanding and consent.

## 2024-11-20 DIAGNOSIS — R79.1 ABNORMAL COAGULATION PROFILE: ICD-10-CM

## 2024-11-20 DIAGNOSIS — Z01.818 PRE-OP TESTING: Primary | ICD-10-CM

## 2024-11-22 NOTE — PREPROCEDURE INSTRUCTIONS
Current Medications   Medication Instructions    allopurinol (Zyloprim) 100 mg tablet Continue until night before surgery    amLODIPine (Norvasc) 5 mg tablet Take morning of surgery with sip of water    carvedilol (Coreg) 12.5 mg tablet Take morning of surgery with sip of water    Eliquis 5 mg tablet Last dose 11/20    PROzac 20 mg capsule Take morning of surgery with sip of water    spironolactone (Aldactone) 25 mg tablet Continue until night before surgery    torsemide (Demadex) 20 mg tablet Continue until night before surgery    traZODone (Desyrel) 100 mg tablet Continue until night before surgery          NPO Instructions:    Do not eat any food after midnight the night before your surgery/procedure.  You may have 13 ounces of clear liquids until TWO hours before surgery/procedure. (Completed by 0830). This includes water, black tea/coffee, (no milk or cream) apple juice and electrolyte drinks (Gatorade).  You may chew gum up to TWO hours before your surgery/procedure.    Additional Instructions:     Day of Surgery: Arrival 0900, surgery 1030.    Enter through main entrance of [facility], located at [address]. Proceed to registration, located in the back right hand corner. You will need your ID and insurance card for registration. Please ensure you have a responsible adult to drive you home.     Take a shower the morning of or night before your procedure. After you shower avoid lotions, powders, deodorants or anything applied to the skin. If you wear contacts or glasses, wear the glasses. If you do not have glasses, please bring a case for your contacts. You may wear hearing aids and dentures, bring a case for them or we will provide one. Make sure you wear something loose and comfortable. Keep in mind your surgery type and wear something that will accommodate incisions or bandages. Please remove all jewelry.     For further questions [facility] can be contacted at [phone] between  7AM-3PM.

## 2024-11-25 ENCOUNTER — PHARMACY VISIT (OUTPATIENT)
Dept: PHARMACY | Facility: CLINIC | Age: 59
End: 2024-11-25
Payer: COMMERCIAL

## 2024-11-25 ENCOUNTER — HOSPITAL ENCOUNTER (OUTPATIENT)
Facility: HOSPITAL | Age: 59
Setting detail: OUTPATIENT SURGERY
Discharge: HOME | End: 2024-11-25
Attending: SURGERY | Admitting: SURGERY
Payer: MEDICARE

## 2024-11-25 ENCOUNTER — ANESTHESIA (OUTPATIENT)
Dept: OPERATING ROOM | Facility: HOSPITAL | Age: 59
End: 2024-11-25
Payer: MEDICARE

## 2024-11-25 ENCOUNTER — ANESTHESIA EVENT (OUTPATIENT)
Dept: OPERATING ROOM | Facility: HOSPITAL | Age: 59
End: 2024-11-25
Payer: MEDICARE

## 2024-11-25 VITALS
OXYGEN SATURATION: 95 % | SYSTOLIC BLOOD PRESSURE: 149 MMHG | BODY MASS INDEX: 38.1 KG/M2 | TEMPERATURE: 96.8 F | HEART RATE: 64 BPM | WEIGHT: 306.44 LBS | HEIGHT: 75 IN | DIASTOLIC BLOOD PRESSURE: 76 MMHG | RESPIRATION RATE: 16 BRPM

## 2024-11-25 DIAGNOSIS — L05.91 PILONIDAL CYST: Primary | ICD-10-CM

## 2024-11-25 DIAGNOSIS — K42.9 UMBILICAL HERNIA WITHOUT OBSTRUCTION AND WITHOUT GANGRENE: ICD-10-CM

## 2024-11-25 LAB
ANION GAP SERPL CALC-SCNC: 11 MMOL/L (ref 10–20)
BUN SERPL-MCNC: 12 MG/DL (ref 6–23)
CALCIUM SERPL-MCNC: 9.1 MG/DL (ref 8.6–10.3)
CHLORIDE SERPL-SCNC: 103 MMOL/L (ref 98–107)
CO2 SERPL-SCNC: 25 MMOL/L (ref 21–32)
CREAT SERPL-MCNC: 1.13 MG/DL (ref 0.5–1.3)
EGFRCR SERPLBLD CKD-EPI 2021: 75 ML/MIN/1.73M*2
ERYTHROCYTE [DISTWIDTH] IN BLOOD BY AUTOMATED COUNT: 13.5 % (ref 11.5–14.5)
GLUCOSE BLD MANUAL STRIP-MCNC: 85 MG/DL (ref 74–99)
GLUCOSE SERPL-MCNC: 85 MG/DL (ref 74–99)
HCT VFR BLD AUTO: 40.5 % (ref 41–52)
HGB BLD-MCNC: 13.8 G/DL (ref 13.5–17.5)
MCH RBC QN AUTO: 28.6 PG (ref 26–34)
MCHC RBC AUTO-ENTMCNC: 34.1 G/DL (ref 32–36)
MCV RBC AUTO: 84 FL (ref 80–100)
NRBC BLD-RTO: 0 /100 WBCS (ref 0–0)
PLATELET # BLD AUTO: 221 X10*3/UL (ref 150–450)
POCT INTERNATIONAL NORMALIZATION RATIO: 1.1
POCT PROTHROMBIN TIME: 13.1 SECONDS
POTASSIUM SERPL-SCNC: 4.2 MMOL/L (ref 3.5–5.3)
RBC # BLD AUTO: 4.82 X10*6/UL (ref 4.5–5.9)
SODIUM SERPL-SCNC: 135 MMOL/L (ref 136–145)
WBC # BLD AUTO: 5.7 X10*3/UL (ref 4.4–11.3)

## 2024-11-25 PROCEDURE — 82947 ASSAY GLUCOSE BLOOD QUANT: CPT

## 2024-11-25 PROCEDURE — 2500000005 HC RX 250 GENERAL PHARMACY W/O HCPCS: Performed by: SURGERY

## 2024-11-25 PROCEDURE — 2500000001 HC RX 250 WO HCPCS SELF ADMINISTERED DRUGS (ALT 637 FOR MEDICARE OP): Performed by: SURGERY

## 2024-11-25 PROCEDURE — 85027 COMPLETE CBC AUTOMATED: CPT | Performed by: ANESTHESIOLOGY

## 2024-11-25 PROCEDURE — A49594 PR RPR AA HERNIA 1ST 3-10 CM NCRC8/STRANGULATED: Performed by: ANESTHESIOLOGY

## 2024-11-25 PROCEDURE — 80048 BASIC METABOLIC PNL TOTAL CA: CPT | Performed by: ANESTHESIOLOGY

## 2024-11-25 PROCEDURE — 2500000004 HC RX 250 GENERAL PHARMACY W/ HCPCS (ALT 636 FOR OP/ED): Performed by: ANESTHESIOLOGY

## 2024-11-25 PROCEDURE — 36415 COLL VENOUS BLD VENIPUNCTURE: CPT | Performed by: ANESTHESIOLOGY

## 2024-11-25 PROCEDURE — 2500000004 HC RX 250 GENERAL PHARMACY W/ HCPCS (ALT 636 FOR OP/ED): Performed by: ANESTHESIOLOGIST ASSISTANT

## 2024-11-25 PROCEDURE — 7100000010 HC PHASE TWO TIME - EACH INCREMENTAL 1 MINUTE: Performed by: SURGERY

## 2024-11-25 PROCEDURE — 2720000007 HC OR 272 NO HCPCS: Performed by: SURGERY

## 2024-11-25 PROCEDURE — 3600000017 HC OR TIME - EACH INCREMENTAL 1 MINUTE - PROCEDURE LEVEL SIX: Performed by: SURGERY

## 2024-11-25 PROCEDURE — 85610 PROTHROMBIN TIME: CPT | Performed by: SURGERY

## 2024-11-25 PROCEDURE — 3600000018 HC OR TIME - INITIAL BASE CHARGE - PROCEDURE LEVEL SIX: Performed by: SURGERY

## 2024-11-25 PROCEDURE — 2500000004 HC RX 250 GENERAL PHARMACY W/ HCPCS (ALT 636 FOR OP/ED): Performed by: SURGERY

## 2024-11-25 PROCEDURE — A49594 PR RPR AA HERNIA 1ST 3-10 CM NCRC8/STRANGULATED: Performed by: ANESTHESIOLOGIST ASSISTANT

## 2024-11-25 PROCEDURE — 2780000003 HC OR 278 NO HCPCS: Performed by: SURGERY

## 2024-11-25 PROCEDURE — 7100000009 HC PHASE TWO TIME - INITIAL BASE CHARGE: Performed by: SURGERY

## 2024-11-25 PROCEDURE — 3700000002 HC GENERAL ANESTHESIA TIME - EACH INCREMENTAL 1 MINUTE: Performed by: SURGERY

## 2024-11-25 PROCEDURE — 3700000001 HC GENERAL ANESTHESIA TIME - INITIAL BASE CHARGE: Performed by: SURGERY

## 2024-11-25 PROCEDURE — C1781 MESH (IMPLANTABLE): HCPCS | Performed by: SURGERY

## 2024-11-25 PROCEDURE — 96372 THER/PROPH/DIAG INJ SC/IM: CPT | Performed by: SURGERY

## 2024-11-25 PROCEDURE — 49594 RPR AA HRN 1ST 3-10 NCR/STRN: CPT | Performed by: SURGERY

## 2024-11-25 PROCEDURE — RXMED WILLOW AMBULATORY MEDICATION CHARGE

## 2024-11-25 DEVICE — VENTRALEX ST HERNIA PATCH, 8.0 CM (3.2"), CIRCLE
Type: IMPLANTABLE DEVICE | Site: UMBILICAL | Status: FUNCTIONAL
Brand: VENTRALEX

## 2024-11-25 RX ORDER — LABETALOL HYDROCHLORIDE 5 MG/ML
10 INJECTION, SOLUTION INTRAVENOUS ONCE AS NEEDED
Status: DISCONTINUED | OUTPATIENT
Start: 2024-11-25 | End: 2024-11-25 | Stop reason: HOSPADM

## 2024-11-25 RX ORDER — HEPARIN SODIUM 5000 [USP'U]/ML
5000 INJECTION, SOLUTION INTRAVENOUS; SUBCUTANEOUS ONCE
Status: COMPLETED | OUTPATIENT
Start: 2024-11-25 | End: 2024-11-25

## 2024-11-25 RX ORDER — ONDANSETRON HYDROCHLORIDE 2 MG/ML
4 INJECTION, SOLUTION INTRAVENOUS ONCE AS NEEDED
Status: DISCONTINUED | OUTPATIENT
Start: 2024-11-25 | End: 2024-11-25 | Stop reason: HOSPADM

## 2024-11-25 RX ORDER — ACETAMINOPHEN 325 MG/1
650 TABLET ORAL EVERY 4 HOURS PRN
Status: DISCONTINUED | OUTPATIENT
Start: 2024-11-25 | End: 2024-11-25 | Stop reason: HOSPADM

## 2024-11-25 RX ORDER — HYDROMORPHONE HYDROCHLORIDE 1 MG/ML
1 INJECTION, SOLUTION INTRAMUSCULAR; INTRAVENOUS; SUBCUTANEOUS EVERY 5 MIN PRN
Status: DISCONTINUED | OUTPATIENT
Start: 2024-11-25 | End: 2024-11-25 | Stop reason: HOSPADM

## 2024-11-25 RX ORDER — ACETAMINOPHEN 325 MG/1
975 TABLET ORAL ONCE
Status: COMPLETED | OUTPATIENT
Start: 2024-11-25 | End: 2024-11-25

## 2024-11-25 RX ORDER — ONDANSETRON HYDROCHLORIDE 2 MG/ML
INJECTION, SOLUTION INTRAVENOUS AS NEEDED
Status: DISCONTINUED | OUTPATIENT
Start: 2024-11-25 | End: 2024-11-25

## 2024-11-25 RX ORDER — BUPIVACAINE HYDROCHLORIDE 5 MG/ML
INJECTION, SOLUTION PERINEURAL AS NEEDED
Status: DISCONTINUED | OUTPATIENT
Start: 2024-11-25 | End: 2024-11-25 | Stop reason: HOSPADM

## 2024-11-25 RX ORDER — MIDAZOLAM HYDROCHLORIDE 1 MG/ML
INJECTION, SOLUTION INTRAMUSCULAR; INTRAVENOUS AS NEEDED
Status: DISCONTINUED | OUTPATIENT
Start: 2024-11-25 | End: 2024-11-25

## 2024-11-25 RX ORDER — DIPHENHYDRAMINE HYDROCHLORIDE 50 MG/ML
12.5 INJECTION INTRAMUSCULAR; INTRAVENOUS ONCE AS NEEDED
Status: DISCONTINUED | OUTPATIENT
Start: 2024-11-25 | End: 2024-11-25 | Stop reason: HOSPADM

## 2024-11-25 RX ORDER — HYDRALAZINE HYDROCHLORIDE 20 MG/ML
10 INJECTION INTRAMUSCULAR; INTRAVENOUS EVERY 30 MIN PRN
Status: DISCONTINUED | OUTPATIENT
Start: 2024-11-25 | End: 2024-11-25 | Stop reason: HOSPADM

## 2024-11-25 RX ORDER — PROPOFOL 10 MG/ML
INJECTION, EMULSION INTRAVENOUS AS NEEDED
Status: DISCONTINUED | OUTPATIENT
Start: 2024-11-25 | End: 2024-11-25

## 2024-11-25 RX ORDER — SODIUM CHLORIDE, SODIUM LACTATE, POTASSIUM CHLORIDE, CALCIUM CHLORIDE 600; 310; 30; 20 MG/100ML; MG/100ML; MG/100ML; MG/100ML
100 INJECTION, SOLUTION INTRAVENOUS CONTINUOUS
Status: DISCONTINUED | OUTPATIENT
Start: 2024-11-25 | End: 2024-11-25 | Stop reason: HOSPADM

## 2024-11-25 RX ORDER — GABAPENTIN 300 MG/1
600 CAPSULE ORAL ONCE
Status: COMPLETED | OUTPATIENT
Start: 2024-11-25 | End: 2024-11-25

## 2024-11-25 RX ORDER — FENTANYL CITRATE 50 UG/ML
INJECTION, SOLUTION INTRAMUSCULAR; INTRAVENOUS AS NEEDED
Status: DISCONTINUED | OUTPATIENT
Start: 2024-11-25 | End: 2024-11-25

## 2024-11-25 RX ORDER — LIDOCAINE HCL/PF 100 MG/5ML
SYRINGE (ML) INTRAVENOUS AS NEEDED
Status: DISCONTINUED | OUTPATIENT
Start: 2024-11-25 | End: 2024-11-25

## 2024-11-25 RX ORDER — SCOLOPAMINE TRANSDERMAL SYSTEM 1 MG/1
1 PATCH, EXTENDED RELEASE TRANSDERMAL
Status: DISCONTINUED | OUTPATIENT
Start: 2024-11-25 | End: 2024-11-25 | Stop reason: HOSPADM

## 2024-11-25 RX ORDER — SODIUM CHLORIDE 0.9 G/100ML
IRRIGANT IRRIGATION AS NEEDED
Status: DISCONTINUED | OUTPATIENT
Start: 2024-11-25 | End: 2024-11-25 | Stop reason: HOSPADM

## 2024-11-25 RX ORDER — FENTANYL CITRATE 50 UG/ML
INJECTION, SOLUTION INTRAMUSCULAR; INTRAVENOUS
Status: COMPLETED
Start: 2024-11-25 | End: 2024-11-25

## 2024-11-25 RX ORDER — TRAMADOL HYDROCHLORIDE 50 MG/1
50 TABLET ORAL EVERY 6 HOURS PRN
Qty: 12 TABLET | Refills: 0 | Status: SHIPPED | OUTPATIENT
Start: 2024-11-25 | End: 2024-12-02

## 2024-11-25 SDOH — HEALTH STABILITY: MENTAL HEALTH: CURRENT SMOKER: 1

## 2024-11-25 ASSESSMENT — PAIN SCALES - GENERAL
PAINLEVEL_OUTOF10: 10 - WORST POSSIBLE PAIN
PAINLEVEL_OUTOF10: 0 - NO PAIN
PAINLEVEL_OUTOF10: 10 - WORST POSSIBLE PAIN
PAIN_LEVEL: 0

## 2024-11-25 ASSESSMENT — COLUMBIA-SUICIDE SEVERITY RATING SCALE - C-SSRS
6. HAVE YOU EVER DONE ANYTHING, STARTED TO DO ANYTHING, OR PREPARED TO DO ANYTHING TO END YOUR LIFE?: NO
1. IN THE PAST MONTH, HAVE YOU WISHED YOU WERE DEAD OR WISHED YOU COULD GO TO SLEEP AND NOT WAKE UP?: NO
2. HAVE YOU ACTUALLY HAD ANY THOUGHTS OF KILLING YOURSELF?: NO

## 2024-11-25 ASSESSMENT — PAIN - FUNCTIONAL ASSESSMENT
PAIN_FUNCTIONAL_ASSESSMENT: 0-10

## 2024-11-25 ASSESSMENT — PAIN DESCRIPTION - LOCATION: LOCATION: ABDOMEN

## 2024-11-25 NOTE — OP NOTE
OPEN UMBILICAL HERNIA REPAIR Operative Note     Date: 2024  OR Location: [facility] OR    Name: Joel Rutherford, : 1965, Age: 59 y.o., MRN: [id number], Sex: male    Diagnosis  Pre-op Diagnosis      * Umbilical hernia without obstruction and without gangrene [K42.9] Post-op Diagnosis     * Umbilical hernia without obstruction and without gangrene [K42.9]     Procedures  OPEN UMBILICAL HERNIA REPAIR  [id number] - [place] RPR AA HERNIA 1ST 3-10 CM NCRC8/STRANGULATED      Surgeons      * Mihir Obrien - Primary    Resident/Fellow/Other Assistant:  Surgeons and Role:  * No surgeons found with a matching role *    Staff:   Circulator: Shari  Surgical Assistant: Joe  Surgical Assistant: Pedro Herrera Person: Marleny  Circulator: Shreya    Anesthesia Staff: Anesthesiologist: Mendez Steve MD  C-AA: WANG Allan    Procedure Summary  Anesthesia: General  ASA: III  Estimated Blood Loss: 5 mL  Intra-op Medications:   Administrations occurring from 1026 to 1311 on 24:   Medication Name Total Dose   BUPivacaine HCl (Marcaine) 0.5 % (5 mg/mL) injection 10 mL   sodium chloride 0.9 % irrigation solution 500 mL   fentaNYL (Sublimaze) injection 50 mcg/mL 200 mcg   lidocaine (cardiac) injection 2% prefilled syringe 100 mg   propofol (Diprivan) injection 10 mg/mL 2,387.94 mg   ceFAZolin (Ancef) 3 g in dextrose 5%  mL 3 g              Anesthesia Record               Intraprocedure I/O Totals       None           Specimen:   ID Type Source Tests Collected by Time   1 : UMBILICAL HERNIA SAC AND CONTENTS Tissue HERNIA SAC SURGICAL PATHOLOGY EXAM Mihir Obrien MD PhD 2024 1043                 Drains and/or Catheters: * None in log *    Tourniquet Times:         Implants:  Implants       Type Name Action Serial No.      Surgical Mesh Sling Implant PATCH, HERNIA, VENTRALEX ST, LRG, 3.2 X 3.2 - [id number] Implanted               Findings: 3.2 incarcerated umbilical hernia, containing fat. Closed and reinforced with 8 cm  circular Ventralight ST mesh.    Indications: Joel Rutherford is an 59 y.o. male who is having surgery for Umbilical hernia without obstruction and without gangrene [K42.9].     The patient was seen in the preoperative area. The risks, benefits, complications, treatment options, non-operative alternatives, expected recovery and outcomes were discussed with the patient. The possibilities of reaction to medication, pulmonary aspiration, injury to surrounding structures, bleeding, recurrent infection, the need for additional procedures, failure to diagnose a condition, and creating a complication requiring transfusion or operation were discussed with the patient. The patient concurred with the proposed plan, giving informed consent.  The site of surgery was properly noted/marked if necessary per policy. The patient has been actively warmed in preoperative area. Preoperative antibiotics have been ordered and given within 1 hours of incision. Venous thrombosis prophylaxis have been ordered including bilateral sequential compression devices and chemical prophylaxis    Procedure Details: The patient was taken to the operating room. A presurgical huddle was completed which included the patient's identifiers, consent, procedure, and equipment. The patient was placed in the supine position with both arms out on gel padded operating table and placed under MAC. All pressure points were padded. The patient was prepped and draped in the normal sterile fashion. IV prophylactic antibiotics were given prior to incision.     0.5 marcaine was injected. A small incision was made superior to the palpated umbilical hernia. The skin was thin but healthy. The sac was dissected free from the umbilical stalk using blunt dissection and electrocautery, taking care not to pass the level of the posterior sheath or contact any bowel. The sac was partially removed and sent as specimen. Excess fat was tied off with 3-0 vicryl and placed back in the  abdomen. The fascial edges were exposed.    The peritoneal space was smooth anteriorly with no adhered fat and a 8.0 circular mesh was placed below the peritoneum. It appeared flat on all sides. Using 0 prolene it was sutured in place while simultaneously closing the anterior fascia with interrupted sutures. The number of stitches required was 5. The area was irrigated and using a 3-0 vicryl suture the umbilical dermis was sutured down to the fascia.    The area was irrigated with saline solution, and 0.5% Marcaine was injected to provide prolonged postoperative pain relief. The subcutaneous tissues were closed with layers of 3-0 vicryl and the skin was closed with 4-0 monocryl and skin glue. I was present and scrubbed for the entire procedure.    Complications:  None; patient tolerated the procedure well.    Disposition: PACU - hemodynamically stable.  Condition: stable         Task Performed by RNFA or Surgical Assistant:  Closure          Additional Details: None    Attending Attestation: I was present and scrubbed for the entire procedure.    Mihir Obrien  Phone Number: [phone]

## 2024-11-25 NOTE — ANESTHESIA POSTPROCEDURE EVALUATION
Patient: Jeol Rutherford    Procedure Summary       Date: 11/25/24 Room / Location: [facility] OR 09 / [facility]    Anesthesia Start: 1022 Anesthesia Stop: 1140    Procedure: OPEN UMBILICAL HERNIA REPAIR (Abdomen) Diagnosis:       Umbilical hernia without obstruction and without gangrene      (Umbilical hernia without obstruction and without gangrene [K42.9])    Surgeons: Mihir Obrien MD PhD Responsible Provider: Mendez Steve MD    Anesthesia Type: general ASA Status: 3            Anesthesia Type: general    Vitals Value Taken Time   /72 11/25/24 1139   Temp 36 °C (96.8 °F) 11/25/24 1139   Pulse 65 11/25/24 1139   Resp 18 11/25/24 1139   SpO2 99 % 11/25/24 1139       Anesthesia Post Evaluation    Patient location during evaluation: PACU  Patient participation: complete - patient participated  Level of consciousness: awake  Pain score: 0  Pain management: adequate  Airway patency: patent  Cardiovascular status: acceptable  Respiratory status: acceptable  Hydration status: acceptable  Postoperative Nausea and Vomiting: none        There were no known notable events for this encounter.

## 2024-11-25 NOTE — ANESTHESIA PREPROCEDURE EVALUATION
Patient: Joel Rutherford    Procedure Information       Date/Time: 11/25/24 1030    Procedure: ROBOTIC ASSISTED UMBILICAL HERNIA REPAIR    Location: [facility] OR 05 / [facility]    Surgeons: Mihir Obrien MD PhD            Relevant Problems   Anesthesia (within normal limits)      Cardiac   (+) Atrial fibrillation (Multi)   (+) Essential hypertension   (+) Hypertensive emergency   (+) NSTEMI, initial episode of care (Multi)   (+) Systolic congestive heart failure      Neuro   (+) Bipolar disorder   (+) Polyneuropathy in diseases classified elsewhere (Multi)      GI   (+) Hemorrhage of rectum and anus   (+) Rectal bleeding      Endocrine   (+) Morbid obesity (Multi)   (+) Type 2 diabetes mellitus without complications (Multi)      Hematology   (+) Other thrombophilia      Musculoskeletal   (+) Polyarticular acute idiopathic gout      ID   (+) Hidradenitis suppurativa       Clinical information reviewed:   Tobacco  Allergies  Meds   Med Hx  Surg Hx   Fam Hx          NPO Detail:  NPO/Void Status  Date of Last Liquid: 11/25/24  Time of Last Liquid: 0800  Date of Last Solid: 11/24/24  Time of Last Solid: 0800         Physical Exam    Airway  Mallampati: II  TM distance: >3 FB  Neck ROM: full     Cardiovascular - normal exam  Rhythm: regular  Rate: normal     Dental - normal exam     Pulmonary - normal exam  Breath sounds clear to auscultation     Abdominal            Anesthesia Plan    History of general anesthesia?: yes  History of complications of general anesthesia?: no    ASA 3     general     The patient is a current smoker.  Patient was not previously instructed to abstain from smoking on day of procedure.  Patient smoked on day of procedure.  Education provided regarding risk of obstructive sleep apnea.  intravenous induction   Postoperative administration of opioids is intended.  Trial extubation is planned.  Anesthetic plan and risks discussed with patient.  Use of blood products discussed with patient who  consented to blood products.    Plan discussed with CAA.

## 2024-11-25 NOTE — INTERVAL H&P NOTE
H&P reviewed. The patient was examined and there are no changes to the H&P.    Discussed with anesthesia and patient. Will do open repair to avoid ventilation issues.

## 2024-12-04 LAB
LABORATORY COMMENT REPORT: NORMAL
PATH REPORT.FINAL DX SPEC: NORMAL
PATH REPORT.GROSS SPEC: NORMAL
PATH REPORT.RELEVANT HX SPEC: NORMAL
PATH REPORT.TOTAL CANCER: NORMAL

## 2024-12-05 ENCOUNTER — TELEPHONE (OUTPATIENT)
Dept: SURGERY | Facility: CLINIC | Age: 59
End: 2024-12-05
Payer: MEDICARE

## 2024-12-05 NOTE — TELEPHONE ENCOUNTER
Patient called seeking clarification as to when his surgery would be so he could appropriately arrange transportation.  I spoke with surgery scheduling, was informed patient is the first case of the day and usually a 6:30 am arrival.  I called and spoke with patient regarding this information and all questions were addressed and answered.  Patient verbalized understanding.

## 2024-12-09 ENCOUNTER — PRE-ADMISSION TESTING (OUTPATIENT)
Dept: PREADMISSION TESTING | Facility: HOSPITAL | Age: 59
End: 2024-12-09
Payer: MEDICARE

## 2024-12-09 VITALS
BODY MASS INDEX: 35.36 KG/M2 | SYSTOLIC BLOOD PRESSURE: 117 MMHG | WEIGHT: 284.39 LBS | TEMPERATURE: 97.5 F | HEIGHT: 75 IN | RESPIRATION RATE: 18 BRPM | HEART RATE: 82 BPM | OXYGEN SATURATION: 98 % | DIASTOLIC BLOOD PRESSURE: 70 MMHG

## 2024-12-09 DIAGNOSIS — Z01.818 PRE-OP TESTING: ICD-10-CM

## 2024-12-09 DIAGNOSIS — R79.1 ABNORMAL COAGULATION PROFILE: ICD-10-CM

## 2024-12-09 LAB
APTT PPP: 31 SECONDS (ref 27–38)
INR PPP: 1 (ref 0.9–1.1)
PROTHROMBIN TIME: 11.3 SECONDS (ref 9.8–12.8)

## 2024-12-09 PROCEDURE — 85730 THROMBOPLASTIN TIME PARTIAL: CPT

## 2024-12-09 PROCEDURE — 99204 OFFICE O/P NEW MOD 45 MIN: CPT

## 2024-12-09 PROCEDURE — 36415 COLL VENOUS BLD VENIPUNCTURE: CPT

## 2024-12-09 RX ORDER — CARVEDILOL 12.5 MG/1
TABLET ORAL 2 TIMES DAILY
COMMUNITY

## 2024-12-09 ASSESSMENT — DUKE ACTIVITY SCORE INDEX (DASI)
CAN YOU DO MODERATE WORK AROUND THE HOUSE LIKE VACUUMING, SWEEPING FLOORS OR CARRYING GROCERIES: NO
CAN YOU DO HEAVY WORK AROUND THE HOUSE LIKE SCRUBBING FLOORS OR LIFTING AND MOVING HEAVY FURNITURE: YES
CAN YOU TAKE CARE OF YOURSELF (EAT, DRESS, BATHE, OR USE TOILET): YES
CAN YOU PARTICIPATE IN STRENOUS SPORTS LIKE SWIMMING, SINGLES TENNIS, FOOTBALL, BASKETBALL, OR SKIING: NO
CAN YOU DO LIGHT WORK AROUND THE HOUSE LIKE DUSTING OR WASHING DISHES: YES
DASI METS SCORE: 6.3
CAN YOU WALK A BLOCK OR TWO ON LEVEL GROUND: YES
CAN YOU CLIMB A FLIGHT OF STAIRS OR WALK UP A HILL: YES
CAN YOU RUN A SHORT DISTANCE: NO
CAN YOU PARTICIPATE IN MODERATE RECREATIONAL ACTIVITIES LIKE GOLF, BOWLING, DANCING, DOUBLES TENNIS OR THROWING A BASEBALL OR FOOTBALL: NO
CAN YOU DO YARD WORK LIKE RAKING LEAVES, WEEDING OR PUSHING A MOWER: NO
CAN YOU HAVE SEXUAL RELATIONS: YES
TOTAL_SCORE: 28.7
CAN YOU WALK INDOORS, SUCH AS AROUND YOUR HOUSE: YES

## 2024-12-09 NOTE — PREPROCEDURE INSTRUCTIONS
"   Medication List            Accurate as of December 9, 2024 10:38 AM. Always use your most recent med list.                allopurinol 100 mg tablet  Commonly known as: Zyloprim  Medication Adjustments for Surgery: Take/Use as prescribed     amLODIPine 5 mg tablet  Commonly known as: Norvasc  Medication Adjustments for Surgery: Take/Use as prescribed     carvedilol 12.5 mg tablet  Commonly known as: Coreg  Medication Adjustments for Surgery: Take/Use as prescribed     colchicine 0.6 mg tablet  Medication Adjustments for Surgery: Take/Use as prescribed     Droplet Pen Needle 31 gauge x 5/16\" needle  Generic drug: pen needle, diabetic  Medication Adjustments for Surgery: Take/Use as prescribed     Eliquis 5 mg tablet  Generic drug: apixaban  Additional Medication Adjustments for Surgery: Other (Comment)  Notes to patient: Hold for 48 hours prior to surgery.  Last dose on 12/9/24     fluticasone 50 mcg/actuation nasal spray  Commonly known as: Flonase  Medication Adjustments for Surgery: Do Not take on the morning of surgery     lidocaine 5 % patch  Commonly known as: Lidoderm  Medication Adjustments for Surgery: Do Not take on the morning of surgery     nystatin 100,000 unit/gram powder  Commonly known as: Mycostatin  Medication Adjustments for Surgery: Do Not take on the morning of surgery     oxyCODONE-acetaminophen 5-325 mg tablet  Commonly known as: Percocet  Medication Adjustments for Surgery: Take/Use as prescribed     Ozempic 0.25 mg or 0.5 mg (2 mg/3 mL) pen injector  Generic drug: semaglutide  Additional Medication Adjustments for Surgery: Other (Comment)  Notes to patient: Hold for 7 days prior to surgery.     polyethylene glycol 17 gram/dose powder  Commonly known as: Glycolax, Miralax  DISSOLVE 17 GRAMS IN 8 OZ OF FLUID LIQUID DRINK DAILY AS DIRECTED  Medication Adjustments for Surgery: Do Not take on the morning of surgery     predniSONE 20 mg tablet  Commonly known as: Deltasone  Medication Adjustments " for Surgery: Take/Use as prescribed     PROzac 20 mg capsule  Generic drug: FLUoxetine  Medication Adjustments for Surgery: Take/Use as prescribed     sildenafil 50 mg tablet  Commonly known as: Viagra  Medication Adjustments for Surgery: Take last dose 3 days before surgery     spironolactone 25 mg tablet  Commonly known as: Aldactone  Medication Adjustments for Surgery: Do Not take on the morning of surgery     temazepam 15 mg capsule  Commonly known as: Restoril  Medication Adjustments for Surgery: Take/Use as prescribed     torsemide 20 mg tablet  Commonly known as: Demadex  Medication Adjustments for Surgery: Do Not take on the morning of surgery     traZODone 100 mg tablet  Commonly known as: Desyrel  Medication Adjustments for Surgery: Take/Use as prescribed     zolpidem 5 mg tablet  Commonly known as: Ambien  Medication Adjustments for Surgery: Take/Use as prescribed            PRE-OPERATIVE INSTRUCTIONS FOR SURGERY    PLEASE REVIEW YOUR MEDICATION LIST  so as to understand any changes in the scheduled before and after surgery.    *Do not eat anything after midnight the night of surgery.  This includes food of any kind (including hard candy, cough drops, mints).   You may have up to 13 ounces of clear liquid  until TWO hours prior to your scheduled surgery time, Clear liquids include water, black tea/coffee, (no milk or cream) apple juice and electrolyte drinks (GATORADE).  You may chew gum until TWO hours prior you your surgery/procedure.         *One of our staff members will call you ONE business day before your surgery, between 11am-2 pm to let you know the time to arrive.    If you have not received a call by 2 pm, call [phone]    *When you arrive at the hospital-->GO TO Registration on the ground floor    *Stop smoking 24 hours prior to surgery.     No Marijuana, CBD Oil or Vaping for 48 hours    *No alcohol 24 hours prior to surgery  *You will need a responsible adult to drive you home    PLEASE  SHOWER both the evening before, and the morning of surgery using the specialty pre-surgery soap as given to you today.  Follow directions for use.    -You may be asked to remove your dentures, partial plate, eyeglasses or contact lenses before going to surgery.  Please bring a case for these items.    -Body piercings need to be removed.  Jewelry and valuables should be left at home.    -Put on loose,  comfortable, clean clothing, that will accommodate bandages      What you may be asked to bring to surgery:    ___PHOTO ID and insurance info     NPO Instructions: (nothing by mouth)    Do not eat any food after midnight the night before your surgery/procedure.  You may have clear liquids until TWO hours before surgery/procedure. This includes water, black tea/coffee, (no milk or cream) apple juice and electrolyte drinks (Gatorade).  You may chew gum up to TWO hours before your surgery/procedure.      Additional Instructions:       Day of Surgery:  Review your medication instructions, take indicated medications  You may have clear liquids until TWO hours before surgery/procedure.  This includes water, black tea/coffee, (no milk or cream) apple juice and electrolyte drinks (Gatorade)  You may chew gum up to TWO hours before your surgery/procedure  Wear  comfortable loose fitting clothing  Do not use moisturizers, creams, lotions or perfume  All jewelry and valuables should be left at home

## 2024-12-09 NOTE — CPM/PAT H&P
CPM/PAT Evaluation       Name: Joel Rutherford (Joel Rutherford)  /Age: 1965/59 y.o.     Visit Type:   In-Person       Chief Complaint: Cyst on lower back requiring intervention    HPI  Patient is a 59-year-old male with a history of HTN, A-fib s/p ablation on Eliquis, HFrEF, cardiac arrest with induction, CKD stage III, ANTONIO on CPAP, GERD, gout, and polysubstance abuse who presents today for preoperative evaluation.  Patient follows with Dr. Obrien for pilonidal cyst and is scheduled for pilonidal cyst excision on 2024. Patient denies recent illness, fever, chills, fatigue, cough, shortness of breath, chest pain, lower extremity edema, urinary or GI symptoms.  Patient had recent umbilical hernia repair surgery.  He denies any incisional redness, drainage, abdominal pain, nausea or vomiting.    Past Medical History:   Diagnosis Date    Obstructive sleep apnea (adult) (pediatric) 10/13/2022    ANTONIO on CPAP    Personal history of other diseases of the circulatory system 2021    History of atrial fibrillation    Personal history of other diseases of the circulatory system 2021    History of heart failure    Personal history of other diseases of urinary system 2021    History of chronic kidney disease       Past Surgical History:   Procedure Laterality Date    OTHER SURGICAL HISTORY  2022    Hernia repair    OTHER SURGICAL HISTORY  2022    Colonoscopy       Patient  has no history on file for sexual activity.    Family History   Problem Relation Name Age of Onset    Drug abuse Mother      Alcohol abuse Mother      Kidney failure Mother         Allergies   Allergen Reactions    Lisinopril Swelling    Methocarbamol Angioedema     Possible Angioedema also on lisinopril same time    Nsaids (Non-Steroidal Anti-Inflammatory Drug) Unknown     On eliquis avoid nsaids       Prior to Admission medications    Medication Sig Start Date End Date Taking? Authorizing Provider   allopurinol (Zyloprim) 100  mg tablet Take 1 tablet (100 mg) by mouth once daily. 7/2/24  Yes Historical Provider, MD   amLODIPine (Norvasc) 5 mg tablet Take 1 tablet (5 mg) by mouth early in the morning.. 5/3/24  Yes Historical Provider, MD   carvedilol (Coreg) 12.5 mg tablet Take by mouth 2 times a day.   Yes Historical Provider, MD   colchicine 0.6 mg tablet    Yes Historical Provider, MD   Eliquis 5 mg tablet Take 1 tablet (5 mg) by mouth 2 times a day.   Yes Historical Provider, MD   fluticasone (Flonase) 50 mcg/actuation nasal spray Administer 2 sprays into each nostril once daily. 9/27/23  Yes Historical Provider, MD   oxyCODONE-acetaminophen (Percocet) 5-325 mg tablet Take 1 tablet by mouth every 4 hours if needed. 8/31/23  Yes Historical Provider, MD   Ozempic 0.25 mg or 0.5 mg (2 mg/3 mL) pen injector  2/12/24  Yes Historical Provider, MD   polyethylene glycol (Glycolax, Miralax) 17 gram/dose powder DISSOLVE 17 GRAMS IN 8 OZ OF FLUID LIQUID DRINK DAILY AS DIRECTED 10/31/24  Yes Mihir Obrien MD PhD   PROzac 20 mg capsule Take 1 capsule (20 mg) by mouth once daily. 3/3/22  Yes Historical Provider, MD   sildenafil (Viagra) 50 mg tablet Take 1 tablet (50 mg) by mouth if needed (1 hour before needed). 8/26/20  Yes Historical Provider, MD   spironolactone (Aldactone) 25 mg tablet Take 0.5 tablets (12.5 mg) by mouth once daily.   Yes Historical Provider, MD   temazepam (Restoril) 15 mg capsule  12/14/22  Yes Historical Provider, MD   torsemide (Demadex) 20 mg tablet Take 1 tablet (20 mg) by mouth 2 times a day.   Yes Historical Provider, MD   zolpidem (Ambien) 5 mg tablet Take 1 tablet (5 mg) by mouth as needed at bedtime (insomnia). 5/26/23  Yes Historical Provider, MD   carvedilol (Coreg) 12.5 mg tablet Take 3 tablets (37.5 mg) by mouth 2 times a day. 8/23/23 12/9/24 Yes Historical Provider, MD   traMADol (Ultram) 50 mg tablet TAKE 1 TABLET (50 MG) EVERY 8 HOURS AS NEEDED FOR 5 DAYS. INDICATION: DENTAL PAIN 9/6/24 12/9/24 Yes  "Historical Provider, MD   Droplet Pen Needle 31 gauge x 5/16\" needle  2/6/24   Historical Provider, MD   lidocaine (Lidoderm) 5 % patch Apply as directed  Patient not taking: Reported on 12/9/2024 7/22/23   Historical Provider, MD   nystatin (Mycostatin) 100,000 unit/gram powder  9/23/23   Historical Provider, MD   predniSONE (Deltasone) 20 mg tablet  8/15/24   Historical Provider, MD   traMADol (Ultram) 50 mg tablet Take 1 tablet (50 mg) by mouth every 6 hours if needed for severe pain (7 - 10) for up to 12 doses. 11/25/24 12/2/24  Mihir Obrien MD PhD   traZODone (Desyrel) 100 mg tablet Take 1 tablet (100 mg) by mouth once daily at bedtime. 9/23/23   Historical Provider, MD   amoxicillin-pot clavulanate (Augmentin) 875-125 mg tablet Take 1 tablet by mouth 2 times a day.  Patient not taking: Reported on 10/29/2024 9/6/24 12/9/24  Historical Provider, MD   atorvastatin (Lipitor) 10 mg tablet  7/22/23 12/9/24  Historical Provider, MD   atorvastatin (Lipitor) 20 mg tablet  12/21/23 12/9/24  Historical Provider, MD   benzoyl peroxide 5 % lotion 1 Application.  Patient not taking: Reported on 10/29/2024 2/21/23 12/9/24  Historical Provider, MD   capsicum (Zostrix) 0.075 % topical cream Apply 1 Application topically 3 times a day.  Patient not taking: Reported on 11/22/2024 4/29/24 12/9/24  Historical Provider, MD   ciprofloxacin (Cipro) 750 mg tablet Take 1 tablet (750 mg) by mouth every 12 hours.  Patient not taking: Reported on 11/22/2024 10/20/24 12/9/24  Historical Provider, MD   famotidine (Pepcid) 40 mg tablet Take 1 tablet (40 mg) by mouth 2 times a day.  Patient not taking: Reported on 10/29/2024 4/11/24 12/9/24  Historical Provider, MD   fluocinolone (DermOtic) 0.01 % ear drops  9/27/23 12/9/24  Historical Provider, MD   gabapentin (Neurontin) 100 mg capsule  8/30/24 12/9/24  Historical Provider, MD    mg tablet  9/23/23 12/9/24  Historical Provider, MD   levoFLOXacin (Levaquin) 750 mg tablet TAKE " 1 TABLET BY MOUTH ONCE A DAY FOR URINARY TRACT INFECTION  Patient not taking: Reported on 11/22/2024 8/13/24 12/9/24  Historical Provider, MD   lidocaine (RectiCare) 5 % cream cream Apply 1 Application topically. 1 to 3 times a day as needed for pain 2/21/22 12/9/24  Historical Provider, MD   lisinopril 20 mg tablet Take 1 tablet (20 mg) by mouth 2 times a day.  Patient not taking: Reported on 10/29/2024  12/9/24  Historical Provider, MD   methocarbamol (Robaxin) 750 mg tablet  4/29/24 12/9/24  Historical Provider, MD   metroNIDAZOLE (Flagyl) 500 mg tablet TAKE 1 TABLET BY MOUTH 3 TIMES A DAY FOR 10 DAYS  Patient not taking: Reported on 11/22/2024 10/20/24 12/9/24  Historical Provider, MD   nicotine (Nicoderm CQ) 14 mg/24 hr patch Place 1 patch on the skin once daily.  Patient not taking: Reported on 11/22/2024 5/17/24 12/9/24  Historical Provider, MD   oxyCODONE (Roxicodone) 5 mg immediate release tablet Take 1 tablet (5 mg) by mouth every 6 hours if needed for pain. 6/28/24 12/9/24  Historical Provider, MD   varenicline (Chantix) 1 mg tablet  9/10/24 12/9/24  Historical Provider, MD        A ten-point review of systems was completed and is otherwise negative except for what is mentioned in the HPI above.    Physical Exam:    GENERAL: Well developed, AA male, awake/alert/oriented x3, no distress, alert and cooperative  HEENT: MMM  NECK: Trachea midline, no lymphadenopathy  CARDIOVASCULAR: RRR, normal S1 and S 2, no murmurs, 2+ equal pulses of the extremities  RESPIRATORY: Patent airways, CTAB, normal breath sounds with good chest expansion, thorax symmetric  ABDOMEN: Soft, non-tender, no distention.  Umbilical incision well approximated and healing. No drainage or erythema.   SKIN: Warm and dry  EXTREMITIES: No cyanosis, edema  NEURO: A&O x 3, normal motor and sensation, no focal deficits   PSYCH: Appropriate mood and behavior      PAT AIRWAY:   Airway:     Mallampati::  II    TM distance::  >3 FB    Neck ROM::   Full  normal        Visit Vitals  /70   Pulse 82   Temp 36.4 °C (97.5 °F) (Temporal)   Resp 18       DASI Risk Score      Flowsheet Row Pre-Admission Testing from 12/9/2024 in [facility]   Can you take care of yourself (eat, dress, bathe, or use toilet)?  2.75 filed at 12/09/2024 1000   Can you walk indoors, such as around your house? 1.75 filed at 12/09/2024 1000   Can you walk a block or two on level ground?  2.75 filed at 12/09/2024 1000   Can you climb a flight of stairs or walk up a hill? 5.5 filed at 12/09/2024 1000   Can you run a short distance? 0 filed at 12/09/2024 1000   Can you do light work around the house like dusting or washing dishes? 2.7 filed at 12/09/2024 1000   Can you do moderate work around the house like vacuuming, sweeping floors or carrying groceries? 0 filed at 12/09/2024 1000   Can you do heavy work around the house like scrubbing floors or lifting and moving heavy furniture?  8 filed at 12/09/2024 1000   Can you do yard work like raking leaves, weeding or pushing a mower? 0 filed at 12/09/2024 1000   Can you have sexual relations? 5.25 filed at 12/09/2024 1000   Can you participate in moderate recreational activities like golf, bowling, dancing, doubles tennis or throwing a baseball or football? 0 filed at 12/09/2024 1000   Can you participate in strenous sports like swimming, singles tennis, football, basketball, or skiing? 0 filed at 12/09/2024 1000   DASI SCORE 28.7 filed at 12/09/2024 1000   METS Score (Will be calculated only when all the questions are answered) 6.3 filed at 12/09/2024 1000          Caprini DVT Assessment    No data to display       Modified Frailty Index    No data to display       CHADS2 Stroke Risk  Current as of 16 minutes ago        5.9% 3 to 100%: High Risk   2 to < 3%: Medium Risk   0 to < 2%: Low Risk     No Change          This score determines the patient's risk of having a stroke if the patient has atrial fibrillation.          Points  Metrics   1 Has Congestive Heart Failure:  Yes     Patients with congestive heart failure get 1 point.    Current as of 16 minutes ago   1 Has Hypertension:  Yes     Patients with hypertension get 1 point.    Current as of 16 minutes ago   0 Age:  59     Patients who are 75 years of age or older get 1 point.    Current as of 16 minutes ago   1 Has Diabetes Excluding Gestational Diabetes:  Yes     Patients with diabetes get 1 point.    Current as of 16 minutes ago   0 Had Stroke:  No  Had TIA:  No  Had Thromboembolism:  No     Patients who have had a stroke, TIA, or thromboembolism get 2 points.    Current as of 16 minutes ago             Revised Cardiac Risk Index      Flowsheet Row Pre-Admission Testing from 12/9/2024 in [facility]   High-Risk Surgery (Intraperitoneal, Intrathoracic,Suprainguinal vascular) 0 filed at 12/09/2024 1121   History of ischemic heart disease (History of MI, History of positive exercuse test, Current chest paint considered due to myocardial ischemia, Use of nitrate therapy, ECG with pathological Q Waves) 1 filed at 12/09/2024 1121   History of congestive heart failure (pulmonary edemia, bilateral rales or S3 gallop, Paroxysmal nocturnal dyspnea, CXR showing pulmonary vascular redistribution) 1 filed at 12/09/2024 1121   History of cerebrovascular disease (Prior TIA or stroke) 0 filed at 12/09/2024 1121   Pre-operative insulin treatment 0 filed at 12/09/2024 1121   Pre-operative creatinine>2 mg/dl 0 filed at 12/09/2024 1121   Revised Cardiac Risk Calculator 2 filed at 12/09/2024 1121          Apfel Simplified Score    No data to display       Risk Analysis Index Results This Encounter    No data found in the last 10 encounters.       Stop Bang Score      Flowsheet Row Pre-Admission Testing from 12/9/2024 in [facility]   Do you snore loudly? 1 filed at 12/09/2024 0959   Do you often feel tired or fatigued after your sleep? 1 filed at 12/09/2024 0959   Has anyone  ever observed you stop breathing in your sleep? 1 filed at 12/09/2024 0959   Do you have or are you being treated for high blood pressure? 1 filed at 12/09/2024 0959   Recent BMI (Calculated) 38.3 filed at 12/09/2024 0959   Is BMI greater than 35 kg/m2? 1=Yes filed at 12/09/2024 0959   Age older than 50 years old? 1=Yes filed at 12/09/2024 0959   Gender - Male 1=Yes filed at 12/09/2024 0959          Prodigy: High Risk  Total Score: 18              Prodigy Gender Score     Prodigy Previous Opioid Use Score      Prodigy CHF score          ARISCAT Score for Postoperative Pulmonary Complications    No data to display       Annalisa Perioperative Risk for Myocardial Infarction or Cardiac Arrest (ELOISA)      Flowsheet Row Pre-Admission Testing from 12/9/2024 in [facility]   Age 1.16 filed at 12/09/2024 1123   Functional Status  0 filed at 12/09/2024 1123   ASA Class  -1.92 filed at 12/09/2024 1123   Creatinine 0 filed at 12/09/2024 1123   Type of Procedure  0.54 filed at 12/09/2024 1123   ELOISA Total Score  -5.47 filed at 12/09/2024 1123   ELOISA % 0.42 filed at 12/09/2024 1123            Assessment and Plan:   Patient is a 59-year-old male with a history of HTN, A-fib s/p ablation on Eliquis, HFrEF, cardiac arrest with induction, CKD stage III, ANTONIO on CPAP, GERD, gout, and polysubstance abuse.    #Pilonidal cyst   Scheduled for pilonidal cyst excision on 12/12/2024 with Dr. Obrien    #HTN, Hx  #A-fib s/p ablation on Eliquis  #HFrEF, Hx  #Cardiac arrest with induction of anesthesia, Hx  -Currently maintained on amlodipine, carvedilol, spironolactone, torsemide and Eliquis  > Discussed holding eliquis for 2 days prior to surgery, holding spironolactone and torsemide the day of surgery, and continuing carvedilol throughout the perioperative period.   -Follows regularly with cardiology.  Last seen by Dr. Zelaya in August of this year.  -Echocardiogram from May 2024 demonstrates LVEF 45 to 50%  -Follows with   Nadia in EP.  S/p successful RFA in June 2023.  Patient's rhythm is regular on auscultation during exam today.  -EKG from 8/14/2024 demonstrates sinus tachycardia  -Cardiac clearance on 11/22/2024 > with instructions to hold Eliquis for 72 hours before surgery, continue carvedilol, hold torsemide and spironolactone morning of surgery    #ANTONIO, Hx  Currently maintained on CPAP with no current issues    #CKD stage III  Stable with normal electrolytes and BUN and creatinine on BMP from 11/28/2024    #GERD, Hx  Currently maintained on pantoprazole with no current issues    #Gout, Hx  Recent flareup with hospital admission on 11/27/2024.  Currently with no symptoms on treatment of colchicine, allopurinol and prednisone taper    #Obesity, Hx  Currently on Ozempic for weight loss. Has been holding dose since last week and will have held for 7+ days prior to surgery    #Polysubstance abuse, Hx  #Alcohol abuse, Hx  #Tobacco use  Patient reports he has been clean and sober for the last 5 years  Currently smoking half pack of cigarettes a day    Medical clearance on 11/4/2024 from PCP  Cardiac clearance on 11/22/2024     Labs from 11/28/2024 reviewed and grossly unremarkable aside from hemoglobin 12.7.  PT/INR from 11/25/2024 was normal  EKG from 8/14/2024 demonstrates sinus tachycardia    I spent 40 minutes in the professional and overall care of this patient. Greater than 50% of this time was spent counseling patient, reviewing plan of care and discussing medication perioperative management.    Zakia Ng, CESARIO-CNP

## 2024-12-09 NOTE — H&P (VIEW-ONLY)
CPM/PAT Evaluation       Name: Joel Rutherford (Joel Rutherford)  /Age: 1965/59 y.o.     Visit Type:   In-Person       Chief Complaint: Cyst on lower back requiring intervention    HPI  Patient is a 59-year-old male with a history of HTN, A-fib s/p ablation on Eliquis, HFrEF, cardiac arrest with induction, CKD stage III, ANTONIO on CPAP, GERD, gout, and polysubstance abuse who presents today for preoperative evaluation.  Patient follows with Dr. Obrien for pilonidal cyst and is scheduled for pilonidal cyst excision on 2024. Patient denies recent illness, fever, chills, fatigue, cough, shortness of breath, chest pain, lower extremity edema, urinary or GI symptoms.  Patient had recent umbilical hernia repair surgery.  He denies any incisional redness, drainage, abdominal pain, nausea or vomiting.    Past Medical History:   Diagnosis Date    Obstructive sleep apnea (adult) (pediatric) 10/13/2022    ANTONIO on CPAP    Personal history of other diseases of the circulatory system 2021    History of atrial fibrillation    Personal history of other diseases of the circulatory system 2021    History of heart failure    Personal history of other diseases of urinary system 2021    History of chronic kidney disease       Past Surgical History:   Procedure Laterality Date    OTHER SURGICAL HISTORY  2022    Hernia repair    OTHER SURGICAL HISTORY  2022    Colonoscopy       Patient  has no history on file for sexual activity.    Family History   Problem Relation Name Age of Onset    Drug abuse Mother      Alcohol abuse Mother      Kidney failure Mother         Allergies   Allergen Reactions    Lisinopril Swelling    Methocarbamol Angioedema     Possible Angioedema also on lisinopril same time    Nsaids (Non-Steroidal Anti-Inflammatory Drug) Unknown     On eliquis avoid nsaids       Prior to Admission medications    Medication Sig Start Date End Date Taking? Authorizing Provider   allopurinol (Zyloprim) 100  mg tablet Take 1 tablet (100 mg) by mouth once daily. 7/2/24  Yes Historical Provider, MD   amLODIPine (Norvasc) 5 mg tablet Take 1 tablet (5 mg) by mouth early in the morning.. 5/3/24  Yes Historical Provider, MD   carvedilol (Coreg) 12.5 mg tablet Take by mouth 2 times a day.   Yes Historical Provider, MD   colchicine 0.6 mg tablet    Yes Historical Provider, MD   Eliquis 5 mg tablet Take 1 tablet (5 mg) by mouth 2 times a day.   Yes Historical Provider, MD   fluticasone (Flonase) 50 mcg/actuation nasal spray Administer 2 sprays into each nostril once daily. 9/27/23  Yes Historical Provider, MD   oxyCODONE-acetaminophen (Percocet) 5-325 mg tablet Take 1 tablet by mouth every 4 hours if needed. 8/31/23  Yes Historical Provider, MD   Ozempic 0.25 mg or 0.5 mg (2 mg/3 mL) pen injector  2/12/24  Yes Historical Provider, MD   polyethylene glycol (Glycolax, Miralax) 17 gram/dose powder DISSOLVE 17 GRAMS IN 8 OZ OF FLUID LIQUID DRINK DAILY AS DIRECTED 10/31/24  Yes Mihir Obrien MD PhD   PROzac 20 mg capsule Take 1 capsule (20 mg) by mouth once daily. 3/3/22  Yes Historical Provider, MD   sildenafil (Viagra) 50 mg tablet Take 1 tablet (50 mg) by mouth if needed (1 hour before needed). 8/26/20  Yes Historical Provider, MD   spironolactone (Aldactone) 25 mg tablet Take 0.5 tablets (12.5 mg) by mouth once daily.   Yes Historical Provider, MD   temazepam (Restoril) 15 mg capsule  12/14/22  Yes Historical Provider, MD   torsemide (Demadex) 20 mg tablet Take 1 tablet (20 mg) by mouth 2 times a day.   Yes Historical Provider, MD   zolpidem (Ambien) 5 mg tablet Take 1 tablet (5 mg) by mouth as needed at bedtime (insomnia). 5/26/23  Yes Historical Provider, MD   carvedilol (Coreg) 12.5 mg tablet Take 3 tablets (37.5 mg) by mouth 2 times a day. 8/23/23 12/9/24 Yes Historical Provider, MD   traMADol (Ultram) 50 mg tablet TAKE 1 TABLET (50 MG) EVERY 8 HOURS AS NEEDED FOR 5 DAYS. INDICATION: DENTAL PAIN 9/6/24 12/9/24 Yes  "Historical Provider, MD   Droplet Pen Needle 31 gauge x 5/16\" needle  2/6/24   Historical Provider, MD   lidocaine (Lidoderm) 5 % patch Apply as directed  Patient not taking: Reported on 12/9/2024 7/22/23   Historical Provider, MD   nystatin (Mycostatin) 100,000 unit/gram powder  9/23/23   Historical Provider, MD   predniSONE (Deltasone) 20 mg tablet  8/15/24   Historical Provider, MD   traMADol (Ultram) 50 mg tablet Take 1 tablet (50 mg) by mouth every 6 hours if needed for severe pain (7 - 10) for up to 12 doses. 11/25/24 12/2/24  Mihir Obrien MD PhD   traZODone (Desyrel) 100 mg tablet Take 1 tablet (100 mg) by mouth once daily at bedtime. 9/23/23   Historical Provider, MD   amoxicillin-pot clavulanate (Augmentin) 875-125 mg tablet Take 1 tablet by mouth 2 times a day.  Patient not taking: Reported on 10/29/2024 9/6/24 12/9/24  Historical Provider, MD   atorvastatin (Lipitor) 10 mg tablet  7/22/23 12/9/24  Historical Provider, MD   atorvastatin (Lipitor) 20 mg tablet  12/21/23 12/9/24  Historical Provider, MD   benzoyl peroxide 5 % lotion 1 Application.  Patient not taking: Reported on 10/29/2024 2/21/23 12/9/24  Historical Provider, MD   capsicum (Zostrix) 0.075 % topical cream Apply 1 Application topically 3 times a day.  Patient not taking: Reported on 11/22/2024 4/29/24 12/9/24  Historical Provider, MD   ciprofloxacin (Cipro) 750 mg tablet Take 1 tablet (750 mg) by mouth every 12 hours.  Patient not taking: Reported on 11/22/2024 10/20/24 12/9/24  Historical Provider, MD   famotidine (Pepcid) 40 mg tablet Take 1 tablet (40 mg) by mouth 2 times a day.  Patient not taking: Reported on 10/29/2024 4/11/24 12/9/24  Historical Provider, MD   fluocinolone (DermOtic) 0.01 % ear drops  9/27/23 12/9/24  Historical Provider, MD   gabapentin (Neurontin) 100 mg capsule  8/30/24 12/9/24  Historical Provider, MD    mg tablet  9/23/23 12/9/24  Historical Provider, MD   levoFLOXacin (Levaquin) 750 mg tablet TAKE " 1 TABLET BY MOUTH ONCE A DAY FOR URINARY TRACT INFECTION  Patient not taking: Reported on 11/22/2024 8/13/24 12/9/24  Historical Provider, MD   lidocaine (RectiCare) 5 % cream cream Apply 1 Application topically. 1 to 3 times a day as needed for pain 2/21/22 12/9/24  Historical Provider, MD   lisinopril 20 mg tablet Take 1 tablet (20 mg) by mouth 2 times a day.  Patient not taking: Reported on 10/29/2024  12/9/24  Historical Provider, MD   methocarbamol (Robaxin) 750 mg tablet  4/29/24 12/9/24  Historical Provider, MD   metroNIDAZOLE (Flagyl) 500 mg tablet TAKE 1 TABLET BY MOUTH 3 TIMES A DAY FOR 10 DAYS  Patient not taking: Reported on 11/22/2024 10/20/24 12/9/24  Historical Provider, MD   nicotine (Nicoderm CQ) 14 mg/24 hr patch Place 1 patch on the skin once daily.  Patient not taking: Reported on 11/22/2024 5/17/24 12/9/24  Historical Provider, MD   oxyCODONE (Roxicodone) 5 mg immediate release tablet Take 1 tablet (5 mg) by mouth every 6 hours if needed for pain. 6/28/24 12/9/24  Historical Provider, MD   varenicline (Chantix) 1 mg tablet  9/10/24 12/9/24  Historical Provider, MD        A ten-point review of systems was completed and is otherwise negative except for what is mentioned in the HPI above.    Physical Exam:    GENERAL: Well developed, AA male, awake/alert/oriented x3, no distress, alert and cooperative  HEENT: MMM  NECK: Trachea midline, no lymphadenopathy  CARDIOVASCULAR: RRR, normal S1 and S 2, no murmurs, 2+ equal pulses of the extremities  RESPIRATORY: Patent airways, CTAB, normal breath sounds with good chest expansion, thorax symmetric  ABDOMEN: Soft, non-tender, no distention.  Umbilical incision well approximated and healing. No drainage or erythema.   SKIN: Warm and dry  EXTREMITIES: No cyanosis, edema  NEURO: A&O x 3, normal motor and sensation, no focal deficits   PSYCH: Appropriate mood and behavior      PAT AIRWAY:   Airway:     Mallampati::  II    TM distance::  >3 FB    Neck ROM::   Full  normal        Visit Vitals  /70   Pulse 82   Temp 36.4 °C (97.5 °F) (Temporal)   Resp 18       DASI Risk Score      Flowsheet Row Pre-Admission Testing from 12/9/2024 in [facility]   Can you take care of yourself (eat, dress, bathe, or use toilet)?  2.75 filed at 12/09/2024 1000   Can you walk indoors, such as around your house? 1.75 filed at 12/09/2024 1000   Can you walk a block or two on level ground?  2.75 filed at 12/09/2024 1000   Can you climb a flight of stairs or walk up a hill? 5.5 filed at 12/09/2024 1000   Can you run a short distance? 0 filed at 12/09/2024 1000   Can you do light work around the house like dusting or washing dishes? 2.7 filed at 12/09/2024 1000   Can you do moderate work around the house like vacuuming, sweeping floors or carrying groceries? 0 filed at 12/09/2024 1000   Can you do heavy work around the house like scrubbing floors or lifting and moving heavy furniture?  8 filed at 12/09/2024 1000   Can you do yard work like raking leaves, weeding or pushing a mower? 0 filed at 12/09/2024 1000   Can you have sexual relations? 5.25 filed at 12/09/2024 1000   Can you participate in moderate recreational activities like golf, bowling, dancing, doubles tennis or throwing a baseball or football? 0 filed at 12/09/2024 1000   Can you participate in strenous sports like swimming, singles tennis, football, basketball, or skiing? 0 filed at 12/09/2024 1000   DASI SCORE 28.7 filed at 12/09/2024 1000   METS Score (Will be calculated only when all the questions are answered) 6.3 filed at 12/09/2024 1000          Caprini DVT Assessment    No data to display       Modified Frailty Index    No data to display       CHADS2 Stroke Risk  Current as of 16 minutes ago        5.9% 3 to 100%: High Risk   2 to < 3%: Medium Risk   0 to < 2%: Low Risk     No Change          This score determines the patient's risk of having a stroke if the patient has atrial fibrillation.          Points  Metrics   1 Has Congestive Heart Failure:  Yes     Patients with congestive heart failure get 1 point.    Current as of 16 minutes ago   1 Has Hypertension:  Yes     Patients with hypertension get 1 point.    Current as of 16 minutes ago   0 Age:  59     Patients who are 75 years of age or older get 1 point.    Current as of 16 minutes ago   1 Has Diabetes Excluding Gestational Diabetes:  Yes     Patients with diabetes get 1 point.    Current as of 16 minutes ago   0 Had Stroke:  No  Had TIA:  No  Had Thromboembolism:  No     Patients who have had a stroke, TIA, or thromboembolism get 2 points.    Current as of 16 minutes ago             Revised Cardiac Risk Index      Flowsheet Row Pre-Admission Testing from 12/9/2024 in [facility]   High-Risk Surgery (Intraperitoneal, Intrathoracic,Suprainguinal vascular) 0 filed at 12/09/2024 1121   History of ischemic heart disease (History of MI, History of positive exercuse test, Current chest paint considered due to myocardial ischemia, Use of nitrate therapy, ECG with pathological Q Waves) 1 filed at 12/09/2024 1121   History of congestive heart failure (pulmonary edemia, bilateral rales or S3 gallop, Paroxysmal nocturnal dyspnea, CXR showing pulmonary vascular redistribution) 1 filed at 12/09/2024 1121   History of cerebrovascular disease (Prior TIA or stroke) 0 filed at 12/09/2024 1121   Pre-operative insulin treatment 0 filed at 12/09/2024 1121   Pre-operative creatinine>2 mg/dl 0 filed at 12/09/2024 1121   Revised Cardiac Risk Calculator 2 filed at 12/09/2024 1121          Apfel Simplified Score    No data to display       Risk Analysis Index Results This Encounter    No data found in the last 10 encounters.       Stop Bang Score      Flowsheet Row Pre-Admission Testing from 12/9/2024 in [facility]   Do you snore loudly? 1 filed at 12/09/2024 0959   Do you often feel tired or fatigued after your sleep? 1 filed at 12/09/2024 0959   Has anyone  ever observed you stop breathing in your sleep? 1 filed at 12/09/2024 0959   Do you have or are you being treated for high blood pressure? 1 filed at 12/09/2024 0959   Recent BMI (Calculated) 38.3 filed at 12/09/2024 0959   Is BMI greater than 35 kg/m2? 1=Yes filed at 12/09/2024 0959   Age older than 50 years old? 1=Yes filed at 12/09/2024 0959   Gender - Male 1=Yes filed at 12/09/2024 0959          Prodigy: High Risk  Total Score: 18              Prodigy Gender Score     Prodigy Previous Opioid Use Score      Prodigy CHF score          ARISCAT Score for Postoperative Pulmonary Complications    No data to display       Annalisa Perioperative Risk for Myocardial Infarction or Cardiac Arrest (ELOISA)      Flowsheet Row Pre-Admission Testing from 12/9/2024 in [facility]   Age 1.16 filed at 12/09/2024 1123   Functional Status  0 filed at 12/09/2024 1123   ASA Class  -1.92 filed at 12/09/2024 1123   Creatinine 0 filed at 12/09/2024 1123   Type of Procedure  0.54 filed at 12/09/2024 1123   ELOISA Total Score  -5.47 filed at 12/09/2024 1123   ELOISA % 0.42 filed at 12/09/2024 1123            Assessment and Plan:   Patient is a 59-year-old male with a history of HTN, A-fib s/p ablation on Eliquis, HFrEF, cardiac arrest with induction, CKD stage III, ANTONIO on CPAP, GERD, gout, and polysubstance abuse.    #Pilonidal cyst   Scheduled for pilonidal cyst excision on 12/12/2024 with Dr. Obrien    #HTN, Hx  #A-fib s/p ablation on Eliquis  #HFrEF, Hx  #Cardiac arrest with induction of anesthesia, Hx  -Currently maintained on amlodipine, carvedilol, spironolactone, torsemide and Eliquis  > Discussed holding eliquis for 2 days prior to surgery, holding spironolactone and torsemide the day of surgery, and continuing carvedilol throughout the perioperative period.   -Follows regularly with cardiology.  Last seen by Dr. Zelaya in August of this year.  -Echocardiogram from May 2024 demonstrates LVEF 45 to 50%  -Follows with   Nadia in EP.  S/p successful RFA in June 2023.  Patient's rhythm is regular on auscultation during exam today.  -EKG from 8/14/2024 demonstrates sinus tachycardia  -Cardiac clearance on 11/22/2024 > with instructions to hold Eliquis for 72 hours before surgery, continue carvedilol, hold torsemide and spironolactone morning of surgery    #ANTONIO, Hx  Currently maintained on CPAP with no current issues    #CKD stage III  Stable with normal electrolytes and BUN and creatinine on BMP from 11/28/2024    #GERD, Hx  Currently maintained on pantoprazole with no current issues    #Gout, Hx  Recent flareup with hospital admission on 11/27/2024.  Currently with no symptoms on treatment of colchicine, allopurinol and prednisone taper    #Obesity, Hx  Currently on Ozempic for weight loss. Has been holding dose since last week and will have held for 7+ days prior to surgery    #Polysubstance abuse, Hx  #Alcohol abuse, Hx  #Tobacco use  Patient reports he has been clean and sober for the last 5 years  Currently smoking half pack of cigarettes a day    Medical clearance on 11/4/2024 from PCP  Cardiac clearance on 11/22/2024     Labs from 11/28/2024 reviewed and grossly unremarkable aside from hemoglobin 12.7.  PT/INR from 11/25/2024 was normal  EKG from 8/14/2024 demonstrates sinus tachycardia    I spent 40 minutes in the professional and overall care of this patient. Greater than 50% of this time was spent counseling patient, reviewing plan of care and discussing medication perioperative management.    Zakia Ng, CESARIO-CNP

## 2024-12-12 ENCOUNTER — ANESTHESIA EVENT (OUTPATIENT)
Dept: OPERATING ROOM | Facility: HOSPITAL | Age: 59
End: 2024-12-12
Payer: MEDICARE

## 2024-12-12 ENCOUNTER — ANESTHESIA (OUTPATIENT)
Dept: OPERATING ROOM | Facility: HOSPITAL | Age: 59
End: 2024-12-12
Payer: MEDICARE

## 2024-12-12 ENCOUNTER — HOSPITAL ENCOUNTER (OUTPATIENT)
Facility: HOSPITAL | Age: 59
Setting detail: OUTPATIENT SURGERY
Discharge: HOME | End: 2024-12-12
Attending: SURGERY | Admitting: SURGERY
Payer: MEDICARE

## 2024-12-12 ENCOUNTER — PHARMACY VISIT (OUTPATIENT)
Dept: PHARMACY | Facility: CLINIC | Age: 59
End: 2024-12-12
Payer: COMMERCIAL

## 2024-12-12 VITALS
SYSTOLIC BLOOD PRESSURE: 134 MMHG | RESPIRATION RATE: 18 BRPM | DIASTOLIC BLOOD PRESSURE: 70 MMHG | HEIGHT: 75 IN | BODY MASS INDEX: 35.36 KG/M2 | WEIGHT: 284.39 LBS | TEMPERATURE: 97.5 F | HEART RATE: 61 BPM | OXYGEN SATURATION: 97 %

## 2024-12-12 DIAGNOSIS — L05.91 PILONIDAL CYST: ICD-10-CM

## 2024-12-12 LAB — GLUCOSE BLD MANUAL STRIP-MCNC: 83 MG/DL (ref 74–99)

## 2024-12-12 PROCEDURE — 7100000010 HC PHASE TWO TIME - EACH INCREMENTAL 1 MINUTE: Performed by: SURGERY

## 2024-12-12 PROCEDURE — 2720000007 HC OR 272 NO HCPCS: Performed by: SURGERY

## 2024-12-12 PROCEDURE — 2500000004 HC RX 250 GENERAL PHARMACY W/ HCPCS (ALT 636 FOR OP/ED): Mod: JZ | Performed by: SURGERY

## 2024-12-12 PROCEDURE — 96372 THER/PROPH/DIAG INJ SC/IM: CPT | Performed by: SURGERY

## 2024-12-12 PROCEDURE — 3600000003 HC OR TIME - INITIAL BASE CHARGE - PROCEDURE LEVEL THREE: Performed by: SURGERY

## 2024-12-12 PROCEDURE — 11772 EXC PILONIDAL CYST COMP: CPT | Performed by: SURGERY

## 2024-12-12 PROCEDURE — 3700000002 HC GENERAL ANESTHESIA TIME - EACH INCREMENTAL 1 MINUTE: Performed by: SURGERY

## 2024-12-12 PROCEDURE — 7100000009 HC PHASE TWO TIME - INITIAL BASE CHARGE: Performed by: SURGERY

## 2024-12-12 PROCEDURE — 82947 ASSAY GLUCOSE BLOOD QUANT: CPT

## 2024-12-12 PROCEDURE — 2500000005 HC RX 250 GENERAL PHARMACY W/O HCPCS: Performed by: SURGERY

## 2024-12-12 PROCEDURE — 2500000004 HC RX 250 GENERAL PHARMACY W/ HCPCS (ALT 636 FOR OP/ED): Performed by: ANESTHESIOLOGY

## 2024-12-12 PROCEDURE — 2500000004 HC RX 250 GENERAL PHARMACY W/ HCPCS (ALT 636 FOR OP/ED): Performed by: NURSE ANESTHETIST, CERTIFIED REGISTERED

## 2024-12-12 PROCEDURE — 7100000001 HC RECOVERY ROOM TIME - INITIAL BASE CHARGE: Performed by: SURGERY

## 2024-12-12 PROCEDURE — 7100000002 HC RECOVERY ROOM TIME - EACH INCREMENTAL 1 MINUTE: Performed by: SURGERY

## 2024-12-12 PROCEDURE — 2500000004 HC RX 250 GENERAL PHARMACY W/ HCPCS (ALT 636 FOR OP/ED): Performed by: SURGERY

## 2024-12-12 PROCEDURE — 3600000008 HC OR TIME - EACH INCREMENTAL 1 MINUTE - PROCEDURE LEVEL THREE: Performed by: SURGERY

## 2024-12-12 PROCEDURE — 3700000001 HC GENERAL ANESTHESIA TIME - INITIAL BASE CHARGE: Performed by: SURGERY

## 2024-12-12 PROCEDURE — RXMED WILLOW AMBULATORY MEDICATION CHARGE

## 2024-12-12 PROCEDURE — 45990 SURG DX EXAM ANORECTAL: CPT | Performed by: SURGERY

## 2024-12-12 PROCEDURE — 88304 TISSUE EXAM BY PATHOLOGIST: CPT | Mod: TC,PARLAB | Performed by: SURGERY

## 2024-12-12 PROCEDURE — 2500000002 HC RX 250 W HCPCS SELF ADMINISTERED DRUGS (ALT 637 FOR MEDICARE OP, ALT 636 FOR OP/ED): Mod: MUE

## 2024-12-12 PROCEDURE — 2500000004 HC RX 250 GENERAL PHARMACY W/ HCPCS (ALT 636 FOR OP/ED)

## 2024-12-12 RX ORDER — MIDAZOLAM HYDROCHLORIDE 1 MG/ML
INJECTION, SOLUTION INTRAMUSCULAR; INTRAVENOUS AS NEEDED
Status: DISCONTINUED | OUTPATIENT
Start: 2024-12-12 | End: 2024-12-12

## 2024-12-12 RX ORDER — TRAMADOL HYDROCHLORIDE 50 MG/1
50 TABLET ORAL EVERY 6 HOURS PRN
Qty: 20 TABLET | Refills: 0 | Status: SHIPPED | OUTPATIENT
Start: 2024-12-12 | End: 2024-12-19

## 2024-12-12 RX ORDER — FENTANYL CITRATE 50 UG/ML
INJECTION, SOLUTION INTRAMUSCULAR; INTRAVENOUS AS NEEDED
Status: DISCONTINUED | OUTPATIENT
Start: 2024-12-12 | End: 2024-12-12

## 2024-12-12 RX ORDER — HEPARIN SODIUM 5000 [USP'U]/ML
5000 INJECTION, SOLUTION INTRAVENOUS; SUBCUTANEOUS ONCE
Status: COMPLETED | OUTPATIENT
Start: 2024-12-12 | End: 2024-12-12

## 2024-12-12 RX ORDER — HYDROMORPHONE HYDROCHLORIDE 1 MG/ML
INJECTION, SOLUTION INTRAMUSCULAR; INTRAVENOUS; SUBCUTANEOUS AS NEEDED
Status: DISCONTINUED | OUTPATIENT
Start: 2024-12-12 | End: 2024-12-12

## 2024-12-12 RX ORDER — IPRATROPIUM BROMIDE AND ALBUTEROL SULFATE 2.5; .5 MG/3ML; MG/3ML
3 SOLUTION RESPIRATORY (INHALATION)
Status: DISCONTINUED | OUTPATIENT
Start: 2024-12-12 | End: 2024-12-12 | Stop reason: HOSPADM

## 2024-12-12 RX ORDER — ROCURONIUM BROMIDE 10 MG/ML
INJECTION, SOLUTION INTRAVENOUS AS NEEDED
Status: DISCONTINUED | OUTPATIENT
Start: 2024-12-12 | End: 2024-12-12

## 2024-12-12 RX ORDER — ONDANSETRON HYDROCHLORIDE 2 MG/ML
INJECTION, SOLUTION INTRAVENOUS AS NEEDED
Status: DISCONTINUED | OUTPATIENT
Start: 2024-12-12 | End: 2024-12-12

## 2024-12-12 RX ORDER — BUPIVACAINE HYDROCHLORIDE 5 MG/ML
INJECTION, SOLUTION PERINEURAL AS NEEDED
Status: DISCONTINUED | OUTPATIENT
Start: 2024-12-12 | End: 2024-12-12 | Stop reason: HOSPADM

## 2024-12-12 RX ORDER — LIDOCAINE HYDROCHLORIDE 10 MG/ML
0.1 INJECTION, SOLUTION INFILTRATION; PERINEURAL ONCE
Status: DISCONTINUED | OUTPATIENT
Start: 2024-12-12 | End: 2024-12-12 | Stop reason: HOSPADM

## 2024-12-12 RX ORDER — SODIUM CHLORIDE, SODIUM LACTATE, POTASSIUM CHLORIDE, CALCIUM CHLORIDE 600; 310; 30; 20 MG/100ML; MG/100ML; MG/100ML; MG/100ML
100 INJECTION, SOLUTION INTRAVENOUS CONTINUOUS
Status: DISCONTINUED | OUTPATIENT
Start: 2024-12-12 | End: 2024-12-12 | Stop reason: HOSPADM

## 2024-12-12 RX ORDER — SODIUM CHLORIDE 9 MG/ML
INJECTION, SOLUTION INTRAVENOUS CONTINUOUS PRN
Status: DISCONTINUED | OUTPATIENT
Start: 2024-12-12 | End: 2024-12-12

## 2024-12-12 RX ORDER — MEPERIDINE HYDROCHLORIDE 25 MG/ML
12.5 INJECTION INTRAMUSCULAR; INTRAVENOUS; SUBCUTANEOUS EVERY 10 MIN PRN
Status: DISCONTINUED | OUTPATIENT
Start: 2024-12-12 | End: 2024-12-12 | Stop reason: HOSPADM

## 2024-12-12 RX ORDER — SODIUM CHLORIDE 0.9 G/100ML
IRRIGANT IRRIGATION AS NEEDED
Status: DISCONTINUED | OUTPATIENT
Start: 2024-12-12 | End: 2024-12-12 | Stop reason: HOSPADM

## 2024-12-12 RX ORDER — HYDROMORPHONE HYDROCHLORIDE 1 MG/ML
INJECTION, SOLUTION INTRAMUSCULAR; INTRAVENOUS; SUBCUTANEOUS
Status: COMPLETED
Start: 2024-12-12 | End: 2024-12-12

## 2024-12-12 RX ORDER — HYDROMORPHONE HYDROCHLORIDE 1 MG/ML
1 INJECTION, SOLUTION INTRAMUSCULAR; INTRAVENOUS; SUBCUTANEOUS EVERY 5 MIN PRN
Status: DISCONTINUED | OUTPATIENT
Start: 2024-12-12 | End: 2024-12-12 | Stop reason: HOSPADM

## 2024-12-12 RX ORDER — LIDOCAINE HCL/PF 100 MG/5ML
SYRINGE (ML) INTRAVENOUS AS NEEDED
Status: DISCONTINUED | OUTPATIENT
Start: 2024-12-12 | End: 2024-12-12

## 2024-12-12 RX ORDER — SUCCINYLCHOLINE CHLORIDE 20 MG/ML INJECTION SOLUTION
SOLUTION AS NEEDED
Status: DISCONTINUED | OUTPATIENT
Start: 2024-12-12 | End: 2024-12-12

## 2024-12-12 RX ORDER — SUCCINYLCHOLINE CHLORIDE 20 MG/ML
INJECTION INTRAMUSCULAR; INTRAVENOUS AS NEEDED
Status: DISCONTINUED | OUTPATIENT
Start: 2024-12-12 | End: 2024-12-12

## 2024-12-12 RX ORDER — METRONIDAZOLE 500 MG/100ML
500 INJECTION, SOLUTION INTRAVENOUS ONCE
Status: COMPLETED | OUTPATIENT
Start: 2024-12-12 | End: 2024-12-12

## 2024-12-12 RX ORDER — PROPOFOL 10 MG/ML
INJECTION, EMULSION INTRAVENOUS AS NEEDED
Status: DISCONTINUED | OUTPATIENT
Start: 2024-12-12 | End: 2024-12-12

## 2024-12-12 RX ORDER — IPRATROPIUM BROMIDE AND ALBUTEROL SULFATE 2.5; .5 MG/3ML; MG/3ML
SOLUTION RESPIRATORY (INHALATION)
Status: COMPLETED
Start: 2024-12-12 | End: 2024-12-12

## 2024-12-12 SDOH — HEALTH STABILITY: MENTAL HEALTH: CURRENT SMOKER: 1

## 2024-12-12 ASSESSMENT — COLUMBIA-SUICIDE SEVERITY RATING SCALE - C-SSRS
1. IN THE PAST MONTH, HAVE YOU WISHED YOU WERE DEAD OR WISHED YOU COULD GO TO SLEEP AND NOT WAKE UP?: NO
6. HAVE YOU EVER DONE ANYTHING, STARTED TO DO ANYTHING, OR PREPARED TO DO ANYTHING TO END YOUR LIFE?: NO
2. HAVE YOU ACTUALLY HAD ANY THOUGHTS OF KILLING YOURSELF?: NO

## 2024-12-12 ASSESSMENT — PAIN - FUNCTIONAL ASSESSMENT
PAIN_FUNCTIONAL_ASSESSMENT: 0-10

## 2024-12-12 ASSESSMENT — PAIN SCALES - GENERAL
PAINLEVEL_OUTOF10: 10 - WORST POSSIBLE PAIN
PAINLEVEL_OUTOF10: 0 - NO PAIN
PAINLEVEL_OUTOF10: 1
PAINLEVEL_OUTOF10: 1
PAINLEVEL_OUTOF10: 10 - WORST POSSIBLE PAIN
PAINLEVEL_OUTOF10: 3
PAINLEVEL_OUTOF10: 10 - WORST POSSIBLE PAIN
PAINLEVEL_OUTOF10: 1
PAIN_LEVEL: 4

## 2024-12-12 ASSESSMENT — PAIN DESCRIPTION - DESCRIPTORS
DESCRIPTORS: SORE
DESCRIPTORS: SORE

## 2024-12-12 ASSESSMENT — PAIN DESCRIPTION - LOCATION: LOCATION: BUTTOCKS

## 2024-12-12 NOTE — ANESTHESIA PROCEDURE NOTES
Airway  Date/Time: 12/12/2024 8:26 AM  Urgency: elective    Airway not difficult    Staffing  Performed: CRNA   Authorized by: Farhan Guardado MD    Performed by: CESARIO Sy-BERENICE  Patient location during procedure: OR    Indications and Patient Condition  Indications for airway management: anesthesia  Spontaneous Ventilation: absent  Sedation level: deep  Preoxygenated: yes  Patient position: sniffing  MILS maintained throughout  Mask difficulty assessment: 0 - not attempted  Planned trial extubation    Final Airway Details  Final airway type: endotracheal airway      Successful airway: ETT  Cuffed: yes   Successful intubation technique: video laryngoscopy (Glidescope)  Facilitating devices/methods: intubating stylet  Endotracheal tube insertion site: oral  Blade: Jj  Blade size: #4  ETT size (mm): 7.5  Cormack-Lehane Classification: grade IIa - partial view of glottis  Placement verified by: chest auscultation and capnometry   Cuff volume (mL): 10  Measured from: lips  ETT to lips (cm): 21  Number of attempts at approach: 1  Ventilation between attempts: none  Number of other approaches attempted: 0

## 2024-12-12 NOTE — ANESTHESIA POSTPROCEDURE EVALUATION
Patient: Joel Rutherford    Procedure Summary       Date: 12/12/24 Room / Location: [facility] OR 04 / Virtual [facility]    Anesthesia Start: 0816 Anesthesia Stop:     Procedure: PILONIDAL CYST EXCISION Diagnosis:       Pilonidal cyst      (Pilonidal cyst [L05.91])    Surgeons: Mihir Obrien MD PhD Responsible Provider: Farhan Guardado MD    Anesthesia Type: general ASA Status: 4            Anesthesia Type: general    Vitals Value Taken Time   /67 12/12/24 1011   Temp 36.1 12/12/24 1011   Pulse 71 12/12/24 1010   Resp 18 12/12/24 1011   SpO2 99 % 12/12/24 1010   Vitals shown include unfiled device data.    Anesthesia Post Evaluation    Patient location during evaluation: PACU  Patient participation: complete - patient participated  Level of consciousness: awake and alert  Pain score: 4  Pain management: adequate  Airway patency: patent  Cardiovascular status: acceptable, blood pressure returned to baseline and hemodynamically stable  Respiratory status: acceptable and face mask  Hydration status: acceptable  Postoperative Nausea and Vomiting: none        There were no known notable events for this encounter.

## 2024-12-12 NOTE — SIGNIFICANT EVENT
Discharge instructions reviewed and provided to patient. Patient denies questions related to material reviewed. Sitz bath instructions given, and provided patient with sitz bath setup. Meds to beds did deliver pain medication to bedside. Patient awake/alert. VSS. Surgical dressing intact, mesh underwear in place. Supplies given to patient for PRN change.  Patient voided via urinal in PACU. Per patient, he states he feels ready to discharged at this time. Patient meets criteria for discharge.

## 2024-12-12 NOTE — ANESTHESIA PREPROCEDURE EVALUATION
Patient: Joel Rutherford    Procedure Information       Date/Time: 12/12/24 0800    Procedure: PILONIDAL CYST EXCISION    Location: [facility] 04 / [facility]    Surgeons: Mihir Obrien MD PhD            Relevant Problems   Anesthesia (within normal limits)      Cardiac   (+) Atrial fibrillation (Multi)   (+) Essential hypertension   (+) Hypertensive emergency   (+) NSTEMI, initial episode of care (Multi)   (+) Systolic congestive heart failure      Neuro   (+) Bipolar disorder   (+) Polyneuropathy in diseases classified elsewhere (Multi)      GI   (+) Hemorrhage of rectum and anus   (+) Rectal bleeding      Endocrine   (+) Morbid obesity (Multi)   (+) Type 2 diabetes mellitus without complications (Multi)      Hematology   (+) Other thrombophilia      Musculoskeletal   (+) Polyarticular acute idiopathic gout      ID   (+) Hidradenitis suppurativa       Clinical information reviewed:   Tobacco  Allergies  Meds   Med Hx  Surg Hx   Fam Hx  Soc Hx        NPO Detail:  NPO/Void Status  Date of Last Liquid: 12/12/24  Time of Last Liquid: 0130  Date of Last Solid: 12/10/24  Time of Last Solid: 1800  Last Intake Type: Clear fluids         Physical Exam    Airway  Mallampati: IV  TM distance: >3 FB  Neck ROM: full     Cardiovascular - normal exam     Dental - normal exam     Pulmonary   (+) rhonchi, wheezes     Abdominal   (+) obese             Anesthesia Plan    History of general anesthesia?: yes  History of complications of general anesthesia?: no    ASA 4     general     The patient is a current smoker.  Patient was previously instructed to abstain from smoking on day of procedure.  Patient smoked on day of procedure.    intravenous induction   Postoperative administration of opioids is intended.  Trial extubation is planned.  Anesthetic plan and risks discussed with patient.  Use of blood products discussed with patient who consented to blood products.    Plan discussed with CRNA.

## 2024-12-12 NOTE — OP NOTE
PILONIDAL CYST EXCISION Operative Note     Date: 2024  OR Location: [facility]    Name: Joel Rutherford, : 1965, Age: 59 y.o., MRN: [id number], Sex: male    Diagnosis  Pre-op Diagnosis      * Pilonidal cyst [L05.91] Post-op Diagnosis     * Pilonidal cyst [L05.91]     Procedures  PILONIDAL CYST EXCISION  68327 - AR EXCISION PILONIDAL CYST/SINUS COMPLICATED    AR ANRCT XM SURG REQ ANES GENERAL SPI/EDRL DX [52188]  Surgeons      * Mihir Obrien - Primary    Resident/Fellow/Other Assistant:  Surgeons and Role:     * Mackenzie A Simerlink, MD - Resident - Assisting    Staff:   Circulator: Verenice  Surgical Assistant: Joe Herrera Person: Ally Hawley Circulator: Noemi    Anesthesia Staff: Anesthesiologist: Farhan Guardado MD  CRNA: CESARIO Sy-CRNA    Procedure Summary  Anesthesia: General  ASA: IV  Estimated Blood Loss: 10 mL  Intra-op Medications:   Administrations occurring from 0800 to 0930 on 24:   Medication Name Total Dose   BUPivacaine HCl (Marcaine) 0.5 % (5 mg/mL) injection 10 mL   sodium chloride 0.9 % irrigation solution 1,000 mL   fentaNYL (Sublimaze) injection 50 mcg/mL 100 mcg   ipratropium-albuteroL (Duo-Neb) 0.5-2.5 mg/3 mL nebulizer solution 3 mL 3 mL   lidocaine (cardiac) injection 2% prefilled syringe 100 mg   midazolam (Versed) injection 1 mg/mL 2 mg   propofol (Diprivan) injection 10 mg/mL 200 mg   rocuronium (ZeMuron) 50 mg/5 mL injection 60 mg   NaCl 0.9 % infusion 123.33 mL   succinylcholine chloride injection syringe 200 mg/10 ml 200 mg   ceFAZolin (Ancef) 3 g in dextrose 5%  mL 3 g              Anesthesia Record               Intraprocedure I/O Totals          Intake    ceFAZolin (Ancef) 3 g in dextrose 5%  mL 100.00 mL    Total Intake 100 mL          Specimen:   ID Type Source Tests Collected by Time   1 : pilonidal cyst Tissue CYST PILONIDAL SURGICAL PATHOLOGY EXAM Mihir Obrien MD PhD 2024 0930                 Drains and/or Catheters: * None  in log *    Tourniquet Times:         Implants:     Findings: Two long sinuses, easily probed, roughly 3 cm above anus, 8 cm to infected cyst on left of cleft, 2 cm in diameter with minimal purulence.    Indications: Joel Rutherford is an 59 y.o. male who is having surgery for Pilonidal cyst [L05.91].     The patient was seen in the preoperative area. The risks, benefits, complications, treatment options, non-operative alternatives, expected recovery and outcomes were discussed with the patient. The possibilities of reaction to medication, pulmonary aspiration, injury to surrounding structures, bleeding, recurrent infection, the need for additional procedures, failure to diagnose a condition, and creating a complication requiring transfusion or operation were discussed with the patient. The patient concurred with the proposed plan, giving informed consent.  The site of surgery was properly noted/marked if necessary per policy. The patient has been actively warmed in preoperative area. Preoperative antibiotics have been ordered and given within 1 hours of incision. Venous thrombosis prophylaxis have been ordered including bilateral sequential compression devices and chemical prophylaxis    Procedure Details: During the preoperative huddle the patient's identifiers, consent, operation details, and equipment was verified. The patient was taken to the operating room and then placed in the supine position. Sequential compression stockings were placed and anesthesia was administered, prior to flipping the patient over to prone. The patient was then prepped and draped in the usual sterile fashion and the buttocks were taped. Prophylactic antibiotics were administered or continued. A surgical timeout was then performed confirming the correct patient, procedure, position, equipment, and any necessary operative implants.     The anus was examined and superior posterior tissues were examined under anesthesia. Two distinct sinuses  "were probed and tunneled toward a small visible cyst on the left of the upper cleft. An elliptoid incision was made overlying the suspected lesion including these tracts after injection 0.5% marcaine circumfirentially. Using electrocautery the lesion was excised completely along with any obvious probed sinus tracts.    The wound was irrigated and the field was hemostatic, after several deep pulsatile bleeds were controlled with cautery. Skin flaps were raised with electrocautery to mobilize the wound edges off tension. The tape was removed. The wound was closed in layers with 2-0 vicryl at the base. A 1/4\" penrose was placed above this in the dissected flap space. Interrupted 2-0 nylons were used to close the skin. The penrose was looped on itself with 2-0 silk. The field was hemostatic.    The patient tolerated the procedure well. The patient was transported to the PACU in stable condition. I was present and scrubbed for all critical portions of the case.    Complications:  None; patient tolerated the procedure well.    Disposition: PACU - hemodynamically stable.  Condition: stable         Task Performed by RNFA or Surgical Assistant:  Retraction, Closure          Additional Details: None    Attending Attestation: I was present for the entire procedure.    Mihir Obrien  Phone Number: [phone]      "

## 2024-12-13 ENCOUNTER — TELEPHONE (OUTPATIENT)
Dept: SURGERY | Facility: CLINIC | Age: 59
End: 2024-12-13
Payer: MEDICARE

## 2024-12-13 NOTE — TELEPHONE ENCOUNTER
Left a detailed message with patient that I would like to schedule his post operative visit s/p pilonidal cyst excision, nylon and penrose drain removal with covering provider Dr. Ron Conner.  Upon return call will need appointment scheduled with patient.

## 2024-12-13 NOTE — TELEPHONE ENCOUNTER
Patient called concerned with his gauze on his incision and what he should do with it especially with his sitz baths etc.  I reviewed discharge notes and instructed patient that upon toileting, and sitz bath use he may remove th gauze.  He also may feel more comfortable with gauze at his site as he has a penrose drain.  Patient verbalized understanding and all questions were addressed and answered.

## 2024-12-17 ENCOUNTER — APPOINTMENT (OUTPATIENT)
Dept: SURGERY | Facility: CLINIC | Age: 59
End: 2024-12-17
Payer: MEDICARE

## 2024-12-18 ENCOUNTER — TELEPHONE (OUTPATIENT)
Dept: SURGERY | Facility: CLINIC | Age: 59
End: 2024-12-18
Payer: MEDICARE

## 2024-12-18 DIAGNOSIS — Z98.890 S/P PILONIDAL CYST EXCISION: Primary | ICD-10-CM

## 2024-12-18 RX ORDER — OXYCODONE HYDROCHLORIDE 5 MG/1
5 CAPSULE ORAL EVERY 6 HOURS PRN
Qty: 15 CAPSULE | Refills: 0 | Status: SHIPPED | OUTPATIENT
Start: 2024-12-18 | End: 2024-12-25

## 2024-12-18 RX ORDER — OXYCODONE HYDROCHLORIDE 5 MG/1
5 TABLET, COATED ORAL EVERY 6 HOURS PRN
Qty: 15 TABLET | Refills: 0 | Status: SHIPPED | OUTPATIENT
Start: 2024-12-18 | End: 2024-12-25

## 2024-12-18 NOTE — TELEPHONE ENCOUNTER
Returned patient's message regarding how many sutures Dr. Obrien placed during his operation and if he would be able to get a different pain medicine called into the pharmacy.  Patient stated that he's been unable to sleep, can't lay down due to the pain.  I informed patient that I would send a message to Dr. Obrien and when I know anything he'd be the first one I call.  Patient verbalized understanding.

## 2024-12-18 NOTE — TELEPHONE ENCOUNTER
Received a message from Dr. Obrien to pend to him for approval 15 Oxycodone 5 mg, and that the patient has about 15 sutures from his surgery.  I called and spoke to the patient regarding this information.  All questions were addressed and answered.

## 2024-12-26 ENCOUNTER — APPOINTMENT (OUTPATIENT)
Dept: SURGERY | Facility: CLINIC | Age: 59
End: 2024-12-26
Payer: MEDICARE

## 2024-12-26 VITALS
OXYGEN SATURATION: 97 % | TEMPERATURE: 97.9 F | SYSTOLIC BLOOD PRESSURE: 109 MMHG | RESPIRATION RATE: 18 BRPM | HEIGHT: 75 IN | BODY MASS INDEX: 36.68 KG/M2 | HEART RATE: 79 BPM | DIASTOLIC BLOOD PRESSURE: 73 MMHG | WEIGHT: 295 LBS

## 2024-12-26 DIAGNOSIS — L05.91 PILONIDAL CYST: Primary | ICD-10-CM

## 2024-12-26 RX ORDER — ATORVASTATIN CALCIUM 40 MG/1
TABLET, FILM COATED ORAL
COMMUNITY
Start: 2024-12-03

## 2024-12-26 RX ORDER — BUPROPION HYDROCHLORIDE 150 MG/1
TABLET, FILM COATED, EXTENDED RELEASE ORAL
COMMUNITY
Start: 2024-11-08

## 2024-12-27 ENCOUNTER — APPOINTMENT (OUTPATIENT)
Dept: SURGERY | Facility: CLINIC | Age: 59
End: 2024-12-27
Payer: MEDICARE

## 2024-12-27 NOTE — PROGRESS NOTES
Joel Rutherford underwent excision of a pilonidal cyst under general anesthesia by Dr. Obrien on December 12.  The incision was closed primarily however a drain was left tracking the course of the incision from anterior to superior findings.  He complains of persistent drainage over the area of mild headache no fever no chills.  He has a past history of smoking, BMI 36.8 on anticoagulation for arrhythmia.  Currently denies chest pain shortness of breath leg pain  General  Mental status-alert. General appearance-not in acute distress. Orientation-oriented ×4  Integumentary  Post op wound-intact. No erythema, swelling,  or tenderness.  Serous drainage noted from wound.  Suture line intact.  I removed the drain.  Removed this most superior suture as this was protruding from the subcutaneous tissue interfering with closure as a foreign body.  I will leave the sutures in place until evaluated by Dr. Obrien  Satisfactory postop course.  No evidence of wound infection at this time.  Follow-up with Dr. Obrien

## 2024-12-27 NOTE — PATIENT INSTRUCTIONS
Thank you for choosing [facility].  Your surgical incision following the recent removal of your pilonidal cyst is without signs of infection.  This will continue to drain.  Please contact the office for any change in drainage including but not limited to any foul-smelling drainage concerns fever or chills.  Please follow-up with Dr. Obrien regarding suture removal.  I recommend shower daily, dressing over the area as long as there is drainage

## 2025-01-02 ENCOUNTER — APPOINTMENT (OUTPATIENT)
Dept: SURGERY | Facility: CLINIC | Age: 60
End: 2025-01-02
Payer: MEDICARE

## 2025-01-03 PROBLEM — M25.462 PAIN AND SWELLING OF LEFT KNEE: Status: ACTIVE | Noted: 2024-11-27

## 2025-01-03 PROBLEM — Z98.890: Status: RESOLVED | Noted: 2024-11-27 | Resolved: 2025-01-03

## 2025-01-03 PROBLEM — Z86.79 HISTORY OF HEART FAILURE: Status: RESOLVED | Noted: 2025-01-03 | Resolved: 2025-01-03

## 2025-01-03 PROBLEM — K59.00 CONSTIPATION: Status: ACTIVE | Noted: 2025-01-03

## 2025-01-03 PROBLEM — M79.676 PAIN OF GREAT TOE: Status: ACTIVE | Noted: 2025-01-03

## 2025-01-03 PROBLEM — D23.5 DERMOID CYST OF SKIN OF BACK: Status: ACTIVE | Noted: 2025-01-03

## 2025-01-03 PROBLEM — R10.9 FLANK PAIN: Status: ACTIVE | Noted: 2025-01-03

## 2025-01-03 PROBLEM — F33.1 MAJOR DEPRESSIVE DISORDER, RECURRENT, MODERATE: Status: ACTIVE | Noted: 2025-01-03

## 2025-01-03 PROBLEM — M25.562 PAIN AND SWELLING OF LEFT KNEE: Status: ACTIVE | Noted: 2024-11-27

## 2025-01-07 ENCOUNTER — APPOINTMENT (OUTPATIENT)
Dept: SURGERY | Facility: CLINIC | Age: 60
End: 2025-01-07
Payer: MEDICARE

## 2025-01-07 VITALS
WEIGHT: 297 LBS | BODY MASS INDEX: 36.93 KG/M2 | HEART RATE: 64 BPM | HEIGHT: 75 IN | RESPIRATION RATE: 17 BRPM | TEMPERATURE: 97.2 F | OXYGEN SATURATION: 99 % | DIASTOLIC BLOOD PRESSURE: 75 MMHG | SYSTOLIC BLOOD PRESSURE: 120 MMHG

## 2025-01-07 DIAGNOSIS — Z48.02 VISIT FOR SUTURE REMOVAL: ICD-10-CM

## 2025-01-07 DIAGNOSIS — Z98.890 S/P PILONIDAL CYST EXCISION: ICD-10-CM

## 2025-01-07 DIAGNOSIS — Z09 POSTOPERATIVE EXAMINATION: ICD-10-CM

## 2025-01-07 PROCEDURE — 3078F DIAST BP <80 MM HG: CPT | Performed by: SURGERY

## 2025-01-07 PROCEDURE — 3074F SYST BP LT 130 MM HG: CPT | Performed by: SURGERY

## 2025-01-07 PROCEDURE — 99024 POSTOP FOLLOW-UP VISIT: CPT | Performed by: SURGERY

## 2025-01-07 PROCEDURE — 3008F BODY MASS INDEX DOCD: CPT | Performed by: SURGERY

## 2025-01-07 RX ORDER — OXYCODONE AND ACETAMINOPHEN 5; 325 MG/1; MG/1
1 TABLET ORAL EVERY 6 HOURS PRN
Qty: 20 TABLET | Refills: 0 | Status: SHIPPED | OUTPATIENT
Start: 2025-01-07 | End: 2025-01-14

## 2025-01-07 RX ORDER — SULFAMETHOXAZOLE AND TRIMETHOPRIM 800; 160 MG/1; MG/1
1 TABLET ORAL 2 TIMES DAILY
Qty: 28 TABLET | Refills: 0 | Status: SHIPPED | OUTPATIENT
Start: 2025-01-07 | End: 2025-01-21

## 2025-01-07 ASSESSMENT — PAIN SCALES - GENERAL: PAINLEVEL_OUTOF10: 8

## 2025-01-07 NOTE — PROGRESS NOTES
"Subjective   The patient is status post Pilonidal Cyst Excision.  Pain is controlled without any medications. Patient is eating well and without constipation.    Seen by Dr. Conner on 12/26/2024. \"Serous drainage noted from wound. Suture line intact. I removed the drain. Removed this most superior suture as this was protruding from the subcutaneous tissue interfering with closure as a foreign body.\"    Objective   Physical Exam:  /75 (BP Location: Right arm, Patient Position: Sitting, BP Cuff Size: Adult)   Pulse 64   Temp 36.2 °C (97.2 °F) (Temporal)   Resp 17   Ht 1.905 m (6' 3\")   Wt 135 kg (297 lb)   SpO2 99%   BMI 37.12 kg/m²   Constitutional: alert, not in acute distress, well developed, and well nourished  Abdomen: soft, bowel sounds active, non-tender, no abnormal masses, no hernias noted  Incision(s):  Incision closed without erythema. Nylons being pulled into skin with granulation tissue around it. Seropurlent drainage from out lower apex.  Tenderness at incision site: mild  Pathology: SKIN, SITE NOT STATED, EXCISION:  -- DERMAL FIBROSIS, CHRONIC INFLAMMATION, FOCAL ABSCESS FORMATION, AND GRANULATION TISSUE  -- FEATURES COMPATIBLE WITH CLINICAL HISTORY OF PILONIDAL CYST/SINUS    Assessment/Plan   Diagnoses and all orders for this visit:  S/P pilonidal cyst excision  -     sulfamethoxazole-trimethoprim (Bactrim DS) 800-160 mg tablet; Take 1 tablet by mouth 2 times a day for 14 days.  -     oxyCODONE-acetaminophen (Percocet) 5-325 mg tablet; Take 1 tablet by mouth every 6 hours if needed for severe pain (7 - 10) for up to 7 days.  Postoperative examination  Visit for suture removal    Likely infected space after drain removed with evidence of abscess during index case. Will trial bactrim for two weeks and return to clinic.  "

## 2025-01-08 ENCOUNTER — TELEPHONE (OUTPATIENT)
Dept: SURGERY | Facility: CLINIC | Age: 60
End: 2025-01-08
Payer: COMMERCIAL

## 2025-01-08 NOTE — TELEPHONE ENCOUNTER
Are you able to call him and tell them they are at the pharmacy.    I was going to call him but it was 7:00

## 2025-01-08 NOTE — TELEPHONE ENCOUNTER
Pt called, he says the doctor told him he would place antibiotics and pain medication and send to his pharmacy, he has not received anything

## 2025-01-23 ENCOUNTER — PREP FOR PROCEDURE (OUTPATIENT)
Dept: SURGERY | Facility: HOSPITAL | Age: 60
End: 2025-01-23

## 2025-01-23 ENCOUNTER — APPOINTMENT (OUTPATIENT)
Dept: SURGERY | Facility: CLINIC | Age: 60
End: 2025-01-23
Payer: COMMERCIAL

## 2025-01-23 VITALS
BODY MASS INDEX: 37.2 KG/M2 | OXYGEN SATURATION: 97 % | SYSTOLIC BLOOD PRESSURE: 122 MMHG | HEIGHT: 75 IN | TEMPERATURE: 98.3 F | WEIGHT: 299.2 LBS | DIASTOLIC BLOOD PRESSURE: 67 MMHG | HEART RATE: 76 BPM | RESPIRATION RATE: 17 BRPM

## 2025-01-23 DIAGNOSIS — Z09 POSTOPERATIVE EXAMINATION: ICD-10-CM

## 2025-01-23 DIAGNOSIS — L72.3 SEBACEOUS CYST: ICD-10-CM

## 2025-01-23 DIAGNOSIS — Z98.890 S/P PILONIDAL CYST EXCISION: ICD-10-CM

## 2025-01-23 DIAGNOSIS — L72.3 SEBACEOUS CYST: Primary | ICD-10-CM

## 2025-01-23 RX ORDER — OXYCODONE HYDROCHLORIDE 5 MG/1
TABLET ORAL
COMMUNITY
Start: 2024-12-18

## 2025-01-23 RX ORDER — DOXYCYCLINE 100 MG/1
CAPSULE ORAL
COMMUNITY
Start: 2025-01-07

## 2025-01-23 RX ORDER — OXYCODONE AND ACETAMINOPHEN 5; 325 MG/1; MG/1
1 TABLET ORAL EVERY 4 HOURS PRN
Qty: 5 TABLET | Status: SHIPPED | OUTPATIENT
Start: 2025-01-23 | End: 2025-01-23 | Stop reason: SDUPTHER

## 2025-01-23 RX ORDER — SEMAGLUTIDE 2.68 MG/ML
INJECTION, SOLUTION SUBCUTANEOUS
COMMUNITY
Start: 2025-01-08

## 2025-01-23 ASSESSMENT — ENCOUNTER SYMPTOMS
RESPIRATORY NEGATIVE: 1
VOMITING: 0
NAUSEA: 0
BACK PAIN: 1
ABDOMINAL DISTENTION: 0
CARDIOVASCULAR NEGATIVE: 1
ABDOMINAL PAIN: 0
ENDOCRINE NEGATIVE: 1
EYES NEGATIVE: 1
ARTHRALGIAS: 1

## 2025-01-23 ASSESSMENT — PAIN SCALES - GENERAL: PAINLEVEL_OUTOF10: 8

## 2025-01-23 NOTE — PROGRESS NOTES
Subjective   Patient ID: Joel Rutherford is a 60 y.o. male who presents for Post-op (S/P PILONIDAL CYST EXCISION).  HPI  60 YO M BMI 38 on ozempic and eliquis. PMH of CKD, HFrEF, NICM, A. Fib (on eliquis), and gout (stopped eating meat). Prior diverticulitis with large diverticula on last sigmoidoscopy in 2021.    Has had a few months of drainage from known pilonidal cyst. No prior procedures.  Also large umbilical hernia, worsening for years.    Open umbilical repair 11/25/2024. 3.2 incarcerated umbilical hernia, containing fat. Closed and reinforced with 8 cm circular Ventralight ST mesh.     Pilonidal resection 12/12/2024. Two long sinuses, easily probed, roughly 3 cm above anus, 8 cm to infected cyst on left of cleft, 2 cm in diameter with minimal purulence. Penrose removed 12/26/2024 by Dr. Meadows. 01/07/2025 stitches removed with infection, treated with bactrim.    Returned 01/23/2025 with some residual pain but minimal drainage. No infection noted. Has right upper back cyst with sinus and sebaceous contents coming out when squeezed. Has been like this for years.    Review of Systems   HENT: Negative.     Eyes: Negative.    Respiratory: Negative.     Cardiovascular: Negative.    Gastrointestinal:  Negative for abdominal distention, abdominal pain, nausea and vomiting.   Endocrine: Negative.    Genitourinary: Negative.    Musculoskeletal:  Positive for arthralgias and back pain.   Skin: Negative.        Objective   Physical Exam  Constitutional:       Appearance: He is obese.   HENT:      Head: Atraumatic.      Nose: Nose normal.      Mouth/Throat:      Mouth: Mucous membranes are moist.   Cardiovascular:      Rate and Rhythm: Normal rate and regular rhythm.   Pulmonary:      Effort: Pulmonary effort is normal.      Breath sounds: Normal breath sounds.   Abdominal:      General: There is no distension.      Palpations: Abdomen is soft.      Tenderness: There is no guarding or rebound.      Comments: Umbilical  incision well healed.   Genitourinary:     Comments: Midline incision well healed with scars where nylons were. Bottom apex where penrose emerged still open pinhole size. No drainage or sign of infection.  Skin:     General: Skin is warm.      Comments: Right upper back sinus with caseous discharge. Not infected. Small cyst attached. Chronic.   Neurological:      Mental Status: He is alert. Mental status is at baseline.   Psychiatric:         Mood and Affect: Mood normal.         Thought Content: Thought content normal.         Judgment: Judgment normal.         Assessment/Plan   Problem List Items Addressed This Visit    None  Visit Diagnoses         Codes    Sebaceous cyst    -  Primary L72.3    Relevant Medications    oxyCODONE-acetaminophen (Percocet) 5-325 mg tablet    Other Relevant Orders    Case Request Operating Room: EXCISION, LESION, BACK (Completed)    S/P pilonidal cyst excision     Z98.890    Postoperative examination     Z09          Hold ozempic x 1 week and eliquis x 48 hours prior to surgery.  Will removed cyst under local with patient on left side or prone.       Mihir Obrien MD PhD 01/23/25 12:57 PM

## 2025-01-23 NOTE — PROGRESS NOTES
Subjective   Patient ID: Joel Rutherford is a 60 y.o. male who presents for Post-op (S/P PILONIDAL CYST EXCISION).  HPI    Review of Systems    Objective   Physical Exam    Assessment/Plan   {Assess/PlanSmartLinks:18429}         Mihir Obrien MD PhD 01/23/25 1:03 PM

## 2025-01-24 RX ORDER — OXYCODONE AND ACETAMINOPHEN 5; 325 MG/1; MG/1
1 TABLET ORAL EVERY 4 HOURS PRN
Qty: 5 TABLET | Refills: 0 | Status: SHIPPED | OUTPATIENT
Start: 2025-01-24

## 2025-01-27 ENCOUNTER — TELEPHONE (OUTPATIENT)
Dept: SURGERY | Facility: HOSPITAL | Age: 60
End: 2025-01-27
Payer: MEDICARE

## 2025-01-27 ENCOUNTER — HOSPITAL ENCOUNTER (OUTPATIENT)
Facility: HOSPITAL | Age: 60
Setting detail: OUTPATIENT SURGERY
End: 2025-01-27
Attending: SURGERY | Admitting: SURGERY
Payer: MEDICARE

## 2025-01-27 PROBLEM — L72.3 SEBACEOUS CYST: Status: ACTIVE | Noted: 2025-01-23

## 2025-01-27 NOTE — TELEPHONE ENCOUNTER
Left a detailed message and gave my direct phone number for return call regarding if patient has indeed picked up his prescription.  I informed patient that it was successfully sent to his pharmacy on Friday.  Upon return call questions will be addressed and answered.

## 2025-01-29 ENCOUNTER — OFFICE VISIT (OUTPATIENT)
Dept: CARDIOLOGY | Facility: CLINIC | Age: 60
End: 2025-01-29
Payer: MEDICARE

## 2025-01-29 VITALS
HEIGHT: 75 IN | OXYGEN SATURATION: 94 % | BODY MASS INDEX: 37.3 KG/M2 | WEIGHT: 300 LBS | HEART RATE: 80 BPM | SYSTOLIC BLOOD PRESSURE: 144 MMHG | DIASTOLIC BLOOD PRESSURE: 77 MMHG

## 2025-01-29 DIAGNOSIS — I48.11 LONGSTANDING PERSISTENT ATRIAL FIBRILLATION (MULTI): ICD-10-CM

## 2025-01-29 DIAGNOSIS — I10 ESSENTIAL HYPERTENSION: ICD-10-CM

## 2025-01-29 DIAGNOSIS — I50.22 CHRONIC SYSTOLIC HEART FAILURE: Primary | ICD-10-CM

## 2025-01-29 DIAGNOSIS — R06.09 DYSPNEA ON EXERTION: ICD-10-CM

## 2025-01-29 DIAGNOSIS — I42.9 CARDIOMYOPATHY, UNSPECIFIED TYPE (MULTI): ICD-10-CM

## 2025-01-29 PROCEDURE — 3078F DIAST BP <80 MM HG: CPT | Performed by: INTERNAL MEDICINE

## 2025-01-29 PROCEDURE — 3077F SYST BP >= 140 MM HG: CPT | Performed by: INTERNAL MEDICINE

## 2025-01-29 PROCEDURE — 99214 OFFICE O/P EST MOD 30 MIN: CPT | Performed by: INTERNAL MEDICINE

## 2025-01-29 PROCEDURE — 3008F BODY MASS INDEX DOCD: CPT | Performed by: INTERNAL MEDICINE

## 2025-01-29 ASSESSMENT — PATIENT HEALTH QUESTIONNAIRE - PHQ9
1. LITTLE INTEREST OR PLEASURE IN DOING THINGS: NOT AT ALL
2. FEELING DOWN, DEPRESSED OR HOPELESS: NOT AT ALL
SUM OF ALL RESPONSES TO PHQ9 QUESTIONS 1 AND 2: 0

## 2025-01-29 ASSESSMENT — COLUMBIA-SUICIDE SEVERITY RATING SCALE - C-SSRS
2. HAVE YOU ACTUALLY HAD ANY THOUGHTS OF KILLING YOURSELF?: NO
1. IN THE PAST MONTH, HAVE YOU WISHED YOU WERE DEAD OR WISHED YOU COULD GO TO SLEEP AND NOT WAKE UP?: NO
6. HAVE YOU EVER DONE ANYTHING, STARTED TO DO ANYTHING, OR PREPARED TO DO ANYTHING TO END YOUR LIFE?: NO

## 2025-01-29 ASSESSMENT — PAIN SCALES - GENERAL: PAINLEVEL_OUTOF10: 0-NO PAIN

## 2025-01-29 ASSESSMENT — ENCOUNTER SYMPTOMS
DEPRESSION: 0
OCCASIONAL FEELINGS OF UNSTEADINESS: 0
LOSS OF SENSATION IN FEET: 0

## 2025-01-29 NOTE — PROGRESS NOTES
Subjective   Joel Rutherford is a 60 y.o. male who presents to the [facility] for follow up evaluation of chronic systolic heart failure and atrial fibrillation. Last saw in August 2024.     Since his last visit, has increase exertional dyspnea after hernia repair surgery and pilonidal cyst removal with decrease physical activity during recovery phase.     He has no active symptoms of chest pain, PND, orthopnea, THI, palpitations, syncope, or claudication.     NYHA class I-IIa. Sedentary. Walking 1-2 blocks with rolling walker.     Stopped Jardiance 10 mg a day shortly after taking for concern of side effects in 2022. Attributed a rectal tear that he sought ER evaluation for to this medication. Did not go to a  facility at that time per 3/2023 office note with me.     Past Medical History:  1. Chronic systolic heart failure: 9/2019 LV EF 20-25% [facility]. 2020 MRI 37% at [facility].  2. Atrial fibrillation, chronic, on DOAC; s/p 6/2/2023 AFib PVI and CTI ablation by Dr. Zuniga.  3. Hypertension  4. CKD stage 3  5. Obesity  6. History of cardiac arrest with induction of anesthesia for canceled hernia repair surgery  7. Polyarticular gout     Social History:  Active tobacco smoker (1/2 pack/day). Prior cocaine use (last in 2019)     Family History:  No premature CAD in 1st degree relatives ( 55 years of age for male relatives, 65 years of age for female relatives)     Review of Systems    A 14 point review of systems was asked. All questions were negative except for pertinent positives listed in the HPI.     Current Outpatient Medications on File Prior to Visit   Medication Sig Dispense Refill    allopurinol (Zyloprim) 100 mg tablet Take 1 tablet (100 mg) by mouth once daily.      amLODIPine (Norvasc) 5 mg tablet Take 1 tablet (5 mg) by mouth early in the morning..      atorvastatin (Lipitor) 40 mg tablet       buPROPion (Zyban) 150 mg 12 hr tablet       carvedilol (Coreg) 12.5 mg tablet Take by  "mouth 2 times a day.      colchicine 0.6 mg tablet       doxycycline (Monodox) 100 mg capsule       Droplet Pen Needle 31 gauge x 5/16\" needle       Eliquis 5 mg tablet Take 1 tablet (5 mg) by mouth 2 times a day.      fluticasone (Flonase) 50 mcg/actuation nasal spray Administer 2 sprays into each nostril once daily.      lidocaine (Lidoderm) 5 % patch Apply as directed      nystatin (Mycostatin) 100,000 unit/gram powder       oxyCODONE (Roxicodone) 5 mg immediate release tablet       oxyCODONE-acetaminophen (Percocet) 5-325 mg tablet Take 1 tablet by mouth every 4 hours if needed for severe pain (7 - 10). 5 tablet 0    Ozempic 0.25 mg or 0.5 mg (2 mg/3 mL) pen injector       Ozempic 2 mg/dose (8 mg/3 mL) pen injector       polyethylene glycol (Glycolax, Miralax) 17 gram/dose powder DISSOLVE 17 GRAMS IN 8 OZ OF FLUID LIQUID DRINK DAILY AS DIRECTED 238 g 0    predniSONE (Deltasone) 20 mg tablet       PROzac 20 mg capsule Take 1 capsule (20 mg) by mouth once daily.      sildenafil (Viagra) 50 mg tablet Take 1 tablet (50 mg) by mouth if needed (1 hour before needed).      spironolactone (Aldactone) 25 mg tablet Take 0.5 tablets (12.5 mg) by mouth once daily.      temazepam (Restoril) 15 mg capsule       torsemide (Demadex) 20 mg tablet Take 1 tablet (20 mg) by mouth 2 times a day.      traZODone (Desyrel) 100 mg tablet Take 1 tablet (100 mg) by mouth once daily at bedtime.      zolpidem (Ambien) 5 mg tablet Take 1 tablet (5 mg) by mouth as needed at bedtime (insomnia).      [DISCONTINUED] oxyCODONE-acetaminophen (Percocet) 5-325 mg tablet Take 1 tablet by mouth every 4 hours if needed.      [DISCONTINUED] oxyCODONE-acetaminophen (Percocet) 5-325 mg tablet Take 1 tablet by mouth every 4 hours if needed. 5 tablet      No current facility-administered medications on file prior to visit.         Objective   Physical Exam  BP Readings from Last 3 Encounters:   01/29/25 144/77   01/23/25 122/67   01/07/25 120/75      Wt " "Readings from Last 3 Encounters:   01/29/25 136 kg (300 lb)   01/23/25 136 kg (299 lb 3.2 oz)   01/07/25 135 kg (297 lb)      BMI: Estimated body mass index is 37.5 kg/m² as calculated from the following:    Height as of this encounter: 1.905 m (6' 3\").    Weight as of this encounter: 136 kg (300 lb).  BSA: Estimated body surface area is 2.68 meters squared as calculated from the following:    Height as of this encounter: 1.905 m (6' 3\").    Weight as of this encounter: 136 kg (300 lb).    General: no acute distress  HEENT: EOMI, no scleral icterus.  Lungs: Clear to auscultation bilaterally without wheezing, rales, or rhonchi.  Cardiovascular: Regular rhythm and rate. Normal S1 and S2. No murmurs, rubs, or gallops are appreciated. JVP normal.  Abdomen: Soft, nontender, nondistended. Bowel sounds present.  Extremities: Warm and well perfused with equal 2+ pulses bilaterally.  No edema present.  Neurologic: Alert and oriented x3.      I have personally reviewed the following images and laboratory findings:  Last echocardiogram:  TTE (5/13/2024), [facility]: LV EF 48%, G1DD, basal inferoseptal segment akinetic and  basal inferior segment severely hypokinetic.   Right ventricular systolic function low normal. RVSP 22 mmHg. LV function has improved as compared to 6/22/2020. [Taken from 5/16/2024 [facility] discharge summary]. Full echo report not found on serve of media and [employer] search box.    2/28/2023 (): LV EF 40%, mild conc LVH (LVMI 119 gm/m2), abnormal diastology in AFib (E/e' not reported), moderate-severe LAE (NEDA 49 ml/m2), normal RV size and function, mild HILDA, trivial AI, mild MAC, mild MR, trace TR, unable to estimate RVSP.    4/22/2020- [facility]- Cardiac MRI  IMPRESSION: 1. Overall, the constellation of findings are suggestive of a  nonischemic dilated cardiomyopathy. The left ventricle is mildly dilated with moderate global systolic dysfunction (LVEF 37%). Delayed-enhancement imaging reveals mild mid " "myocardial enhancement involving the mid to distal inferior and inferolateral walls as well as the basal to mid RV insertion points. These findings can be be seen in the setting of myocarditis (unable to distinguish between active inflammation and scar). No other obvious delayed enhancement to  suggest a pattern of ischemic damage. If clinically indicated, recommend  coronary angiography. 2. Normal RV size and systolic function on qualitative assessment. 3. Biatrial enlargement. 4. No significant valvular dysfunction.     Last cath / stress test:  NM PET/CT CARDIAC VIABILITY (2020) conclusion abnormal study. No evidence of ischemia. Evidence of small (<10%) scar in the territory of the distal LAD. LV severely dilated and systolic function is moderately decreased. Normal RV size and systolic function.     Most recent EC2024 ECG: Sinus tachycardia, 112 bpm, LVH criteria. Personally reviewed in office.    Lab Results   Component Value Date    CHOL 171 10/27/2022    CHOL 135 2020     Lab Results   Component Value Date    HDL 42.4 10/27/2022    HDL 36.3 (A) 2020     No results found for: \"LDLCALC\"  Lab Results   Component Value Date    TRIG 176 (H) 10/27/2022    TRIG 139 2020     No components found for: \"CHOLHDL\"      Assessment/Plan   1. Chronic systolic heart failure:  Euvolemic on exam today. Continue neurohormonal remodeling medications carvedilol 37.5 mg twice a day, and spironolactone 12.5 mg a day.     Lisinopril stopped for facial (lips) angioedema.    Did not tolerate use of Jardiance 10 mg a day in 2022 with ER visit with low BP / rectal complication per patient. He is adamant that he will not take medication class again.     Taking torsemide 20 mg twice a day with normal volume status.     Last echocardiogram done in May 2024 at [facility] with LV EF 45-50% in sinus rhythm.     With increase in exertional dyspnea, will order repeat echocardiogram for .     2. Chronic atrial " fibrillation:  Continue Eliquis 5 mg twice a day for CVA prophylaxis. CHADS2-Vasc score 2 (1+ HTN, 1+ CHF). Continue rate control with carvedilol.      In sinus rhythm by exam today. Follows with Dr. Zuniga in EP. Successful June 2023 RFA to help restore sinus rhythm. December 2024 telemetry monitoring in PACU after hernia surgery showed sinus rhythm.    Follow up with Dr. Zelaya in 6 months        SIGNATURE: Diego Zelaya M.D.  [facility]  Senior Attending, Division of Cardiovascular Medicine  Co-Director, [facility]   of Medicine  [employer]  [address] [facility] [address]  [place] [address]  Phone: [phone]  Fax: [phone]  Appointment: [phone]  PATIENT NAME: Joel Rutherford   DATE/TIME: January 29, 2025 3:55 PM MRN: [id number]

## 2025-01-29 NOTE — PATIENT INSTRUCTIONS
You were seen in the [facility] for your heart failure and atrial fibrillation.     Your ECG today shows normal heart rhythm. Your fluid levels are balanced on exam of your heart and blood vessels. Your heart failure is a stable, chronic condition.     For your your heart failure you are takin. Carvedilol 37.5 mg twice a day  2. Spironolactone 12.5 mg a day (1/2 tablet)  3. Torsemide 20 mg 2 tablets a day     Dr. Villalpando started you on Jardiance 10 mg a day. Jardiance is a medication that is a SGLT2 inhibitor and can help reduce heart attack risk by up to 20% and heart failure hospitalization by 50%. You had a reported complication taking this medication in  with a visit to the ER. Will hold off on using a substitute at this time.    Your lisinopril 20 mg a day was stopped for angioedema (swelling of the lips and face) from this medication.     For your atrial fibrillation you are takin. Carvedilol 37.5 mg twice a day  2. Eliquis 5 mg twice a day to prevent a stroke    For your shortness of breath, I am ordering a repeat echocardiogram to look at your heart's pumping strength.      Follow up with Dr. Zelaya in 6 months.

## 2025-02-04 ENCOUNTER — TELEPHONE (OUTPATIENT)
Dept: SURGERY | Facility: HOSPITAL | Age: 60
End: 2025-02-04
Payer: MEDICARE

## 2025-02-04 NOTE — TELEPHONE ENCOUNTER
Spoke with patient regarding his Eliquis.  He is cleared from his PCP to hold his Eliquis two days prior to his procedure with Dr. Obrien, and patient will hold his Ozempic as well. All questions were addressed and answered.

## 2025-02-13 ENCOUNTER — HOSPITAL ENCOUNTER (OUTPATIENT)
Dept: CARDIOLOGY | Facility: CLINIC | Age: 60
Discharge: HOME | End: 2025-02-13
Payer: COMMERCIAL

## 2025-02-13 DIAGNOSIS — I50.22 CHRONIC SYSTOLIC HEART FAILURE: ICD-10-CM

## 2025-02-13 DIAGNOSIS — R06.09 DYSPNEA ON EXERTION: ICD-10-CM

## 2025-02-13 LAB
AORTIC VALVE PEAK VELOCITY: 1.4 M/S
AV PEAK GRADIENT: 8 MMHG
AVA (PEAK VEL): 4.16 CM2
EJECTION FRACTION APICAL 4 CHAMBER: 50.1
EJECTION FRACTION: 53 %
LEFT ATRIUM VOLUME AREA LENGTH INDEX BSA: 25.4 ML/M2
LEFT VENTRICLE INTERNAL DIMENSION DIASTOLE: 5.75 CM (ref 3.5–6)
LEFT VENTRICULAR OUTFLOW TRACT DIAMETER: 2.48 CM
MITRAL VALVE E/A RATIO: 0.91
RIGHT VENTRICLE FREE WALL PEAK S': 13 CM/S
TRICUSPID ANNULAR PLANE SYSTOLIC EXCURSION: 2.4 CM

## 2025-02-13 PROCEDURE — C8929 TTE W OR WO FOL WCON,DOPPLER: HCPCS

## 2025-02-13 PROCEDURE — 93306 TTE W/DOPPLER COMPLETE: CPT | Performed by: INTERNAL MEDICINE

## 2025-02-13 PROCEDURE — 2500000004 HC RX 250 GENERAL PHARMACY W/ HCPCS (ALT 636 FOR OP/ED): Mod: SE | Performed by: INTERNAL MEDICINE

## 2025-02-13 RX ADMIN — PERFLUTREN 3.5 ML OF DILUTION: 6.52 INJECTION, SUSPENSION INTRAVENOUS at 11:37

## 2025-02-17 ENCOUNTER — TELEPHONE (OUTPATIENT)
Dept: SURGERY | Facility: HOSPITAL | Age: 60
End: 2025-02-17
Payer: MEDICARE

## 2025-02-17 NOTE — TELEPHONE ENCOUNTER
Patient called into the office stating that his back still isn't back to normal and at this time he would like to postpone his surgery with Dr. Obrien.  I notified surgery schedulers and had his surgical case removed from Dr. Obrien's schedule.  All questions were addressed and answered.

## 2025-02-19 ENCOUNTER — TELEPHONE (OUTPATIENT)
Dept: CARDIOLOGY | Facility: CLINIC | Age: 60
End: 2025-02-19
Payer: MEDICARE

## 2025-02-19 NOTE — TELEPHONE ENCOUNTER
Echocardiogram results were successfully communicated with the patient, per Dr. Zelaya instruction as noted in results encounter, and they acknowledged their understanding.

## 2025-03-31 ENCOUNTER — TELEPHONE (OUTPATIENT)
Dept: SURGERY | Facility: CLINIC | Age: 60
End: 2025-03-31
Payer: MEDICARE

## 2025-03-31 NOTE — TELEPHONE ENCOUNTER
Left a detailed message and provided patient with my direct phone number for return call.  Patient called earlier with some questions, was simply returning the call.

## 2025-04-07 PROBLEM — L05.91 PILONIDAL CYST: Status: RESOLVED | Noted: 2024-10-29 | Resolved: 2025-04-07

## 2025-04-07 RX ORDER — POLYETHYLENE GLYCOL-3350 AND ELECTROLYTES 236; 6.74; 5.86; 2.97; 22.74 G/274.31G; G/274.31G; G/274.31G; G/274.31G; G/274.31G
POWDER, FOR SOLUTION ORAL
COMMUNITY
Start: 2025-02-17

## 2025-04-07 RX ORDER — TRAMADOL HYDROCHLORIDE 50 MG/1
TABLET ORAL
COMMUNITY
Start: 2025-03-31

## 2025-04-08 ENCOUNTER — APPOINTMENT (OUTPATIENT)
Dept: SURGERY | Facility: CLINIC | Age: 60
End: 2025-04-08
Payer: MEDICARE

## 2025-04-08 VITALS
HEART RATE: 115 BPM | BODY MASS INDEX: 36.99 KG/M2 | WEIGHT: 303.8 LBS | TEMPERATURE: 97.7 F | RESPIRATION RATE: 17 BRPM | HEIGHT: 76 IN | OXYGEN SATURATION: 95 % | SYSTOLIC BLOOD PRESSURE: 132 MMHG | DIASTOLIC BLOOD PRESSURE: 82 MMHG

## 2025-04-08 DIAGNOSIS — R52 PAIN: ICD-10-CM

## 2025-04-08 DIAGNOSIS — Z98.890 HISTORY OF EXCISION OF PILONIDAL CYST: ICD-10-CM

## 2025-04-08 PROCEDURE — 99213 OFFICE O/P EST LOW 20 MIN: CPT | Performed by: SURGERY

## 2025-04-08 PROCEDURE — 3075F SYST BP GE 130 - 139MM HG: CPT | Performed by: SURGERY

## 2025-04-08 PROCEDURE — 3008F BODY MASS INDEX DOCD: CPT | Performed by: SURGERY

## 2025-04-08 PROCEDURE — 3079F DIAST BP 80-89 MM HG: CPT | Performed by: SURGERY

## 2025-04-08 ASSESSMENT — ENCOUNTER SYMPTOMS
BACK PAIN: 1
EYES NEGATIVE: 1
CARDIOVASCULAR NEGATIVE: 1
ENDOCRINE NEGATIVE: 1
VOMITING: 0
ABDOMINAL PAIN: 0
ABDOMINAL DISTENTION: 0
NAUSEA: 0
RESPIRATORY NEGATIVE: 1
ARTHRALGIAS: 1

## 2025-04-08 ASSESSMENT — PAIN SCALES - GENERAL: PAINLEVEL_OUTOF10: 3

## 2025-04-08 NOTE — PROGRESS NOTES
Subjective   Patient ID: Joel Rutherford is a 60 y.o. male who presents for Follow-up (History pilonidal cyst excision ).  HPI  60 YO M BMI 38 on ozempic and eliquis. PMH of CKD, HFrEF, NICM, A. Fib (on eliquis), and gout (stopped eating meat). Prior diverticulitis with large diverticula on last sigmoidoscopy in 2021.    Has had a few months of drainage from known pilonidal cyst. No prior procedures.  Also large umbilical hernia, worsening for years.    Open umbilical repair 11/25/2024. 3.2 incarcerated umbilical hernia, containing fat. Closed and reinforced with 8 cm circular Ventralight ST mesh.     Pilonidal resection 12/12/2024. Two long sinuses, easily probed, roughly 3 cm above anus, 8 cm to infected cyst on left of cleft, 2 cm in diameter with minimal purulence. Penrose removed 12/26/2024 by Dr. Meadows. 01/07/2025 stitches removed with infection, treated with bactrim.    Returned 01/23/2025 with some residual pain but minimal drainage. No infection noted. Has right upper back cyst with sinus and sebaceous contents coming out when squeezed. Has been like this for years.    Returned 04/08/2025 with pain internally to well healed incision at inferior portion of surgical scar. Nothing seen on exam. Has a chin cyst too that I declined surgery on.    Review of Systems   HENT: Negative.     Eyes: Negative.    Respiratory: Negative.     Cardiovascular: Negative.    Gastrointestinal:  Negative for abdominal distention, abdominal pain, nausea and vomiting.   Endocrine: Negative.    Genitourinary: Negative.    Musculoskeletal:  Positive for arthralgias and back pain.   Skin: Negative.        Objective   Physical Exam  Constitutional:       Appearance: He is obese.   HENT:      Head: Atraumatic.      Nose: Nose normal.      Mouth/Throat:      Mouth: Mucous membranes are moist.   Cardiovascular:      Rate and Rhythm: Normal rate and regular rhythm.   Pulmonary:      Effort: Pulmonary effort is normal.      Breath sounds:  Normal breath sounds.   Abdominal:      General: There is no distension.      Palpations: Abdomen is soft.      Tenderness: There is no guarding or rebound.      Comments: Umbilical incision well healed.   Genitourinary:     Comments: Midline incision well healed with scars where nylons were. Bottom apex where penrose emerged still open pinhole size. No drainage or sign of infection.  Skin:     General: Skin is warm.      Comments: Right upper back sinus with caseous discharge. Not infected. Small cyst attached. Chronic.   Neurological:      Mental Status: He is alert. Mental status is at baseline.   Psychiatric:         Mood and Affect: Mood normal.         Thought Content: Thought content normal.         Judgment: Judgment normal.         Assessment/Plan   Problem List Items Addressed This Visit    None  Visit Diagnoses         Codes    History of excision of pilonidal cyst     Z98.890    Pain     R52          Hold ozempic x 1 week and eliquis x 48 hours prior to surgery.  Will removed cyst under local with patient on left side or prone when patient ready.       Mihir Obrien MD PhD 04/08/25 10:19 AM

## 2025-07-30 ENCOUNTER — APPOINTMENT (OUTPATIENT)
Dept: CARDIOLOGY | Facility: CLINIC | Age: 60
End: 2025-07-30
Payer: MEDICARE

## 2025-07-30 DIAGNOSIS — I48.11 LONGSTANDING PERSISTENT ATRIAL FIBRILLATION (MULTI): Primary | ICD-10-CM

## 2025-08-20 ENCOUNTER — OFFICE VISIT (OUTPATIENT)
Dept: CARDIOLOGY | Facility: CLINIC | Age: 60
End: 2025-08-20
Payer: MEDICARE

## 2025-08-20 VITALS
DIASTOLIC BLOOD PRESSURE: 78 MMHG | HEIGHT: 76 IN | OXYGEN SATURATION: 92 % | BODY MASS INDEX: 35.96 KG/M2 | HEART RATE: 75 BPM | SYSTOLIC BLOOD PRESSURE: 125 MMHG | WEIGHT: 295.3 LBS

## 2025-08-20 DIAGNOSIS — I50.22 CHRONIC SYSTOLIC HEART FAILURE: Primary | ICD-10-CM

## 2025-08-20 DIAGNOSIS — I11.0 BENIGN HYPERTENSIVE HEART DISEASE WITH HEART FAILURE: ICD-10-CM

## 2025-08-20 DIAGNOSIS — E78.5 DYSLIPIDEMIA: ICD-10-CM

## 2025-08-20 DIAGNOSIS — I48.0 PAROXYSMAL ATRIAL FIBRILLATION (MULTI): ICD-10-CM

## 2025-08-20 PROCEDURE — G2211 COMPLEX E/M VISIT ADD ON: HCPCS | Performed by: INTERNAL MEDICINE

## 2025-08-20 PROCEDURE — 99214 OFFICE O/P EST MOD 30 MIN: CPT | Performed by: INTERNAL MEDICINE

## 2025-08-20 PROCEDURE — 99212 OFFICE O/P EST SF 10 MIN: CPT

## 2025-08-20 PROCEDURE — 3008F BODY MASS INDEX DOCD: CPT | Performed by: INTERNAL MEDICINE

## 2025-08-20 PROCEDURE — 3074F SYST BP LT 130 MM HG: CPT | Performed by: INTERNAL MEDICINE

## 2025-08-20 PROCEDURE — 3078F DIAST BP <80 MM HG: CPT | Performed by: INTERNAL MEDICINE

## 2025-08-20 PROCEDURE — 93005 ELECTROCARDIOGRAM TRACING: CPT | Performed by: INTERNAL MEDICINE

## 2025-08-20 RX ORDER — APIXABAN 5 MG/1
5 TABLET, FILM COATED ORAL 2 TIMES DAILY
Qty: 60 TABLET | Refills: 11 | Status: SHIPPED | OUTPATIENT
Start: 2025-08-20

## 2025-08-20 RX ORDER — SPIRONOLACTONE 25 MG/1
12.5 TABLET ORAL DAILY
Qty: 45 TABLET | Refills: 3 | Status: SHIPPED | OUTPATIENT
Start: 2025-08-20

## 2025-08-20 RX ORDER — ATORVASTATIN CALCIUM 40 MG/1
40 TABLET, FILM COATED ORAL DAILY
Qty: 90 TABLET | Refills: 3 | Status: SHIPPED | OUTPATIENT
Start: 2025-08-20

## 2025-08-20 RX ORDER — CARVEDILOL 12.5 MG/1
12.5 TABLET ORAL 2 TIMES DAILY
Qty: 180 TABLET | Refills: 3 | Status: SHIPPED | OUTPATIENT
Start: 2025-08-20

## 2025-08-20 ASSESSMENT — PAIN SCALES - GENERAL: PAINLEVEL_OUTOF10: 0-NO PAIN

## 2025-08-20 ASSESSMENT — COLUMBIA-SUICIDE SEVERITY RATING SCALE - C-SSRS
1. IN THE PAST MONTH, HAVE YOU WISHED YOU WERE DEAD OR WISHED YOU COULD GO TO SLEEP AND NOT WAKE UP?: NO
2. HAVE YOU ACTUALLY HAD ANY THOUGHTS OF KILLING YOURSELF?: NO
6. HAVE YOU EVER DONE ANYTHING, STARTED TO DO ANYTHING, OR PREPARED TO DO ANYTHING TO END YOUR LIFE?: NO

## 2025-08-20 ASSESSMENT — PATIENT HEALTH QUESTIONNAIRE - PHQ9
SUM OF ALL RESPONSES TO PHQ9 QUESTIONS 1 AND 2: 1
1. LITTLE INTEREST OR PLEASURE IN DOING THINGS: SEVERAL DAYS
2. FEELING DOWN, DEPRESSED OR HOPELESS: NOT AT ALL

## 2025-08-20 ASSESSMENT — ENCOUNTER SYMPTOMS
OCCASIONAL FEELINGS OF UNSTEADINESS: 1
DEPRESSION: 1
LOSS OF SENSATION IN FEET: 0

## 2025-08-22 LAB
ATRIAL RATE: 75 BPM
P AXIS: 61 DEGREES
P OFFSET: 199 MS
P ONSET: 120 MS
PR INTERVAL: 208 MS
Q ONSET: 224 MS
QRS COUNT: 13 BEATS
QRS DURATION: 82 MS
QT INTERVAL: 384 MS
QTC CALCULATION(BAZETT): 428 MS
QTC FREDERICIA: 413 MS
R AXIS: -33 DEGREES
T AXIS: 28 DEGREES
T OFFSET: 416 MS
VENTRICULAR RATE: 75 BPM

## 2025-09-09 ENCOUNTER — APPOINTMENT (OUTPATIENT)
Dept: SURGERY | Facility: CLINIC | Age: 60
End: 2025-09-09
Payer: MEDICARE

## (undated) DEVICE — Device

## (undated) DEVICE — BANDAGE, GAUZE, CONFORMING, KERLIX, 6 PLY, 4.5 IN X 4.1 YD

## (undated) DEVICE — SLEEVE, VASO PRESS, CALF GARMENT, MEDIUM, GREEN

## (undated) DEVICE — DRAPE, SHEET, THREE QUARTER, FAN FOLD, 57 X 77 IN

## (undated) DEVICE — GOWN, ASTOUND, XL

## (undated) DEVICE — MAT, AIR TRANSFER, 39X81

## (undated) DEVICE — SPONGE, LAP, XRAY DECT, SC+RFID, 4X18, STERILE

## (undated) DEVICE — APPLICATOR, CHLORAPREP, W/ORANGE TINT, 26ML